# Patient Record
Sex: FEMALE | NOT HISPANIC OR LATINO | Employment: UNEMPLOYED | URBAN - METROPOLITAN AREA
[De-identification: names, ages, dates, MRNs, and addresses within clinical notes are randomized per-mention and may not be internally consistent; named-entity substitution may affect disease eponyms.]

---

## 2021-04-06 ENCOUNTER — OFFICE VISIT (OUTPATIENT)
Dept: FAMILY MEDICINE CLINIC | Facility: CLINIC | Age: 36
End: 2021-04-06

## 2021-04-06 VITALS
TEMPERATURE: 97.8 F | HEIGHT: 61 IN | OXYGEN SATURATION: 98 % | WEIGHT: 134 LBS | BODY MASS INDEX: 25.3 KG/M2 | DIASTOLIC BLOOD PRESSURE: 63 MMHG | SYSTOLIC BLOOD PRESSURE: 106 MMHG | HEART RATE: 72 BPM

## 2021-04-06 DIAGNOSIS — E55.9 VITAMIN D DEFICIENCY: ICD-10-CM

## 2021-04-06 DIAGNOSIS — R10.12 LEFT UPPER QUADRANT ABDOMINAL PAIN: ICD-10-CM

## 2021-04-06 DIAGNOSIS — R10.13 EPIGASTRIC PAIN: ICD-10-CM

## 2021-04-06 DIAGNOSIS — Z00.00 HEALTH MAINTENANCE EXAMINATION: Primary | ICD-10-CM

## 2021-04-06 DIAGNOSIS — E03.9 ACQUIRED HYPOTHYROIDISM: ICD-10-CM

## 2021-04-06 PROCEDURE — 99385 PREV VISIT NEW AGE 18-39: CPT | Performed by: NURSE PRACTITIONER

## 2021-04-06 PROCEDURE — 93000 ELECTROCARDIOGRAM COMPLETE: CPT | Performed by: NURSE PRACTITIONER

## 2021-04-06 RX ORDER — SUCRALFATE 1 G/1
1 TABLET ORAL 4 TIMES DAILY
Qty: 60 TABLET | Refills: 0 | Status: SHIPPED | OUTPATIENT
Start: 2021-04-06 | End: 2021-09-13

## 2021-04-06 RX ORDER — FAMOTIDINE 40 MG/1
40 TABLET, FILM COATED ORAL 2 TIMES DAILY
Qty: 60 TABLET | Refills: 3 | Status: SHIPPED | OUTPATIENT
Start: 2021-04-06 | End: 2021-09-13

## 2021-04-06 RX ORDER — LEVOTHYROXINE SODIUM 0.12 MG/1
125 TABLET ORAL DAILY
COMMUNITY
End: 2021-05-04

## 2021-04-06 NOTE — PROGRESS NOTES
"Chief Complaint  Establish Care and Annual Exam    Subjective          Janay Weiss presents to [facility]  Very pleasant patient here today needs a new PCP she moved here from [place] several months ago.  She has no acute complaints, she has a history of acquired hypothyroid since  she is up-to-date with level thyroxine.  She will need some fasting labs soon.  She said no chest pain no shortness of breath but she has been having some epigastric pain a couple months, without radiation and its after meals last a couple hours, and then resolve generally about 2 to 3 days a week.  She has no nausea vomiting no coffee-ground emesis no black tarry stools no associated loose stools or constipation.  She presently has no pain, she has no risk factors or significant risk factors for pancreatitis.  Still has her gallbladder the only abdominal surgery she is has a  approximately 3 years ago with the birth of her healthy child little 3-year-old girl.  She is  and actually also her pleasant  several hours ago.    She has some anxiety now as she has been trying to conceive with her  about 6 or 7 months, and is not pregnant,  She has an appointment with an OB/GYN on the eighth,    Previous pregnancy unfortunately has she had 4 miscarriages prior to conception of full-term pregnancy.    Past medical history as above, healthy  Social history as above no tobacco drug or alcohol abuse.  Family history no breast or colon cancer in the family  No diabetes no heart disease stroke           Objective   Vital Signs:   /63   Pulse 72   Temp 97.8 °F (36.6 °C) (Temporal)   Ht 154.9 cm (61\")   Wt 60.8 kg (134 lb)   SpO2 98%   BMI 25.32 kg/m²     Physical Exam  Vitals reviewed.   Constitutional:       Appearance: She is well-developed.   HENT:      Head: Normocephalic.      Nose: Nose normal.   Eyes:      General: No scleral icterus.     Conjunctiva/sclera: " Conjunctivae normal.      Pupils: Pupils are equal, round, and reactive to light.   Neck:      Thyroid: No thyromegaly.      Vascular: No JVD.   Cardiovascular:      Rate and Rhythm: Normal rate and regular rhythm.      Heart sounds: Normal heart sounds. No murmur heard.   No friction rub. No gallop.    Pulmonary:      Effort: Pulmonary effort is normal. No respiratory distress.      Breath sounds: Normal breath sounds. No stridor. No wheezing or rales.   Abdominal:      General: Bowel sounds are normal. There is no distension.      Palpations: Abdomen is soft.      Tenderness: There is no abdominal tenderness.      Comments: No hepatosplenomegaly, no ascites,   Musculoskeletal:         General: No tenderness.      Cervical back: Neck supple.   Lymphadenopathy:      Cervical: No cervical adenopathy.   Skin:     General: Skin is warm and dry.      Findings: No erythema or rash.   Neurological:      Mental Status: She is alert and oriented to person, place, and time.      Deep Tendon Reflexes: Reflexes are normal and symmetric.   Psychiatric:         Behavior: Behavior normal.         Thought Content: Thought content normal.         Judgment: Judgment normal.        Result Review :                 Assessment and Plan    Diagnoses and all orders for this visit:    1. Health maintenance examination (Primary)  -     CBC & Differential; Future  -     Comprehensive Metabolic Panel; Future  -     Lipid Panel With LDL / HDL Ratio; Future  -     TSH; Future  -     Urinalysis With Microscopic If Indicated (No Culture) - Urine, Clean Catch; Future  -     Vitamin D 25 Hydroxy; Future  -     ECG 12 Lead  -     H. Pylori Antigen, Stool - Stool, Per Rectum; Future  -     Amylase; Future  -     Lipase; Future    2. Vitamin D deficiency  -     CBC & Differential; Future  -     Comprehensive Metabolic Panel; Future  -     Lipid Panel With LDL / HDL Ratio; Future  -     TSH; Future  -     Urinalysis With Microscopic If Indicated (No  Culture) - Urine, Clean Catch; Future  -     Vitamin D 25 Hydroxy; Future  -     H. Pylori Antigen, Stool - Stool, Per Rectum; Future  -     Amylase; Future  -     Lipase; Future    3. Acquired hypothyroidism  -     CBC & Differential; Future  -     Comprehensive Metabolic Panel; Future  -     Lipid Panel With LDL / HDL Ratio; Future  -     TSH; Future  -     Urinalysis With Microscopic If Indicated (No Culture) - Urine, Clean Catch; Future  -     Vitamin D 25 Hydroxy; Future    4. Left upper quadrant abdominal pain  -     H. Pylori Antigen, Stool - Stool, Per Rectum; Future  -     Amylase; Future  -     Lipase; Future    5. Epigastric pain  -     CBC & Differential; Future  -     Comprehensive Metabolic Panel; Future  -     Lipid Panel With LDL / HDL Ratio; Future  -     TSH; Future  -     Urinalysis With Microscopic If Indicated (No Culture) - Urine, Clean Catch; Future  -     Vitamin D 25 Hydroxy; Future  -     ECG 12 Lead  -     H. Pylori Antigen, Stool - Stool, Per Rectum; Future  -     Amylase; Future  -     Lipase; Future    Other orders  -     sucralfate (Carafate) 1 g tablet; Take 1 tablet by mouth 4 (Four) Times a Day. Before meals and at bedtime  Dispense: 60 tablet; Refill: 0  -     famotidine (Pepcid) 40 MG tablet; Take 1 tablet by mouth 2 (Two) Times a Day. For acid reflux  Dispense: 60 tablet; Refill: 3        Follow Up   No follow-ups on file.  Patient was given instructions and counseling regarding her condition or for health maintenance advice. Please see specific information pulled into the AVS if appropriate.     Healthy diet regular exercise OTC take vitamin D 1000 international units daily of vitamin D3    Any severe epigastric pain  Weakness coffee-ground emesis black tarry stools fever emergency room  See gastroenterology if your symptoms do not improve in a few weeks as discussed

## 2021-04-06 NOTE — PATIENT INSTRUCTIONS
You likely have inflammation or erosion of your esophagus or gastric lining,  Pepcid 40 mg twice a day   Decrease spicy greasy foods smaller more bland meals more frequently the next several months  Carafate generic before each meal and bedtime x2 weeks then DC  Discontinue if any issues with this medication  If Pepcid is not working for you or famotidine, I will give you something a little stronger but this should help substantially    Mylanta or similar as needed  For epigastric discomfort    Avoid ibuprofen for this type of pain Tylenol okay  But generally you need an antiacid and something to soothe your stomach for epigastric pain    Severe pain fever chills nausea vomiting weakness coffee-ground emesis black tarry stools weakness emergency room    If your symptoms not improve over the next month you will need an EGD and to see gastroenterology  Follow-up in the office in 6 weeks to recheck your epigastrium and reflux,      Left mole on your left upper chest,, no worries here but should you have changes increase in size color or other irregularity you should see dermatology and call me immediately for referral    Likely will need to put you on vitamin D but will await your lab    Should you become pregnant when you become pregnant will need to increase your thyroid medicine approximately 30% or more  So schedule appointment as soon as possible or call    Use the computer encourage you to sign up for my chart for good communications    We will need to start taking a prenatal vitamin as well for the good folic acid  Your OB will likely get this to you in a couple days otherwise call me and I will do it    More vegetables  More healthy chicken  Decrease breads pastas and rice and sauces which increase appetite, and promote fatty liver and other abnormalities      Yearly physical and labs prior    Come in tomorrow for your fasting labs, and your results may be back in time for your appointment  With your  OB/GYN.  Call for your results a couple days later        Food Choices for Gastroesophageal Reflux Disease, Adult  When you have gastroesophageal reflux disease (GERD), the foods you eat and your eating habits are very important. Choosing the right foods can help ease your discomfort. Think about working with a food expert (dietitian) to help you make good choices.  What are tips for following this plan?  Reading food labels  · Read the label for foods that are low in saturated fat. Foods that may help with your symptoms include:  ? Foods that have less than 5% of daily value (DV) of fat.  ? Foods that have 0 grams of trans fat.  Cooking  · Do not herrera your food. Cook your food by baking, steaming, grilling, or broiling. These are all methods that do not need a lot of fat for cooking.  · To add flavor, try to use herbs that are low in spice and acidity.  Meal planning    · Choose healthy foods that are low in fat, such as:  ? Fruits and vegetables.  ? Whole grains.  ? Low-fat dairy products.  ? Lean meats, fish, and poultry.  · Eat small meals often instead of eating 3 large meals each day. Eat your meals slowly in a place where you are relaxed. Avoid bending over or lying down until 2-3 hours after eating.  · Limit high-fat foods such as fatty meats or fried foods.  · Limit your intake of oils, butter, and shortening to less than 8 teaspoons each day.  · Avoid the following:  ? Foods that cause symptoms. These may be different for different people. Keep a food diary to keep track of foods that cause symptoms.  ? Alcohol.  ? Drinking a lot of liquid with meals.  ? Eating meals during the 2-3 hours before bed.  Lifestyle  · Stay at a healthy weight. Ask your doctor what weight is healthy for you. If you need to lose weight, work with your doctor to do so safely.  · Exercise for at least 30 minutes on 5 or more days each week, or as told by your doctor.  · Wear loose-fitting clothes.  · Do not use any products that  contain nicotine or tobacco, such as cigarettes, e-cigarettes, and chewing tobacco. If you need help quitting, ask your doctor.  · Sleep with the head of your bed higher than your feet. Use a wedge under the mattress or blocks under the bed frame to raise the head of the bed.  What foods should eat?    Eat a healthy, well-balanced diet of fruits, vegetables, whole grains, low-fat dairy products, lean meats, fish, and poultry. Each person is different. Foods that may cause symptoms in one person may not cause any symptoms in another person. Work with your doctor to find foods that are safe for you.  The items listed above may not be a complete list of foods and beverages you can eat. Contact a dietitian for more information.  What foods should I avoid?  Limiting some of these foods may help in managing the symptoms of GERD. Everyone is different. Talk with a food expert or your doctor to help you find the exact foods to avoid, if any.  Fruits  Any fruits prepared with added fat. Any fruits that cause symptoms. For some people, this may include citrus fruits, such as oranges, grapefruit, pineapple, and quoc.  Vegetables  Deep-fried vegetables. French fries. Any vegetables prepared with added fat. Any vegetables that cause symptoms. For some people, this may include tomatoes and tomato products, chili peppers, onions and garlic, and horseradish.  Grains  Pastries or quick breads with added fat.  Meats and other proteins  High-fat meats, such as fatty beef or pork, hot dogs, ribs, ham, sausage, salami, and sher. Fried meat or protein, including fried fish and fried chicken. Nuts and nut butters.  Dairy  Whole milk and chocolate milk. Sour cream. Cream. Ice cream. Cream cheese. Milkshakes.  Fats and oils  Butter. Margarine. Shortening. Ghee.  Beverages  Coffee and tea, with or without caffeine. Carbonated beverages. Sodas. Energy drinks. Fruit juice made with acidic fruits (such as orange or grapefruit). Tomato juice.  Alcoholic drinks.  Sweets and desserts  Chocolate and cocoa. Donuts.  Seasonings and condiments  Pepper. Peppermint and spearmint. Added salt. Any condiments, herbs, or seasonings that cause symptoms. For some people, this may include nelson, hot sauce, or vinegar-based salad dressings.  The items listed above may not be a complete list of foods and beverages you should avoid. Contact a dietitian for more information.  Questions to ask your doctor  Diet and lifestyle changes are often the first steps that are taken to manage symptoms of GERD. If diet and lifestyle changes do not help, talk with your doctor about taking medicines.  Where to find more information  · International Foundation for Gastrointestinal Disorders: aboutgerd.org  Summary  · When you have GERD, food and lifestyle choices are very important in easing your symptoms.  · Eat small meals often instead of 3 large meals a day. Eat your meals slowly and in a place where you are relaxed.  · Avoid bending over or lying down until 2-3 hours after eating.  · Limit high-fat foods such as fatty meat or fried foods.  This information is not intended to replace advice given to you by your health care provider. Make sure you discuss any questions you have with your health care provider.  Document Revised: 10/12/2020 Document Reviewed: 10/12/2020  Elsevier Patient Education © 2021 [employer]

## 2021-04-28 ENCOUNTER — TELEPHONE (OUTPATIENT)
Dept: FAMILY MEDICINE CLINIC | Facility: CLINIC | Age: 36
End: 2021-04-28

## 2021-04-28 DIAGNOSIS — E03.9 ACQUIRED HYPOTHYROIDISM: Primary | ICD-10-CM

## 2021-05-03 ENCOUNTER — INITIAL PRENATAL (OUTPATIENT)
Dept: OBSTETRICS AND GYNECOLOGY | Facility: CLINIC | Age: 36
End: 2021-05-03

## 2021-05-03 VITALS — DIASTOLIC BLOOD PRESSURE: 67 MMHG | BODY MASS INDEX: 24 KG/M2 | WEIGHT: 127 LBS | SYSTOLIC BLOOD PRESSURE: 107 MMHG

## 2021-05-03 DIAGNOSIS — Z98.891 HISTORY OF CESAREAN SECTION: ICD-10-CM

## 2021-05-03 DIAGNOSIS — Z34.80 SUPERVISION OF OTHER NORMAL PREGNANCY, ANTEPARTUM: Primary | ICD-10-CM

## 2021-05-03 DIAGNOSIS — E03.9 HYPOTHYROIDISM (ACQUIRED): ICD-10-CM

## 2021-05-03 LAB
B-HCG UR QL: POSITIVE
GLUCOSE UR STRIP-MCNC: NEGATIVE MG/DL
HCG INTACT+B SERPL-ACNC: NORMAL MIU/ML
INTERNAL NEGATIVE CONTROL: NEGATIVE
INTERNAL POSITIVE CONTROL: POSITIVE
Lab: ABNORMAL
PROT UR STRIP-MCNC: NEGATIVE MG/DL
T4 FREE SERPL-MCNC: 1.63 NG/DL (ref 0.93–1.7)
TSH SERPL DL<=0.005 MIU/L-ACNC: 5.44 UIU/ML (ref 0.27–4.2)

## 2021-05-03 PROCEDURE — 81025 URINE PREGNANCY TEST: CPT | Performed by: OBSTETRICS & GYNECOLOGY

## 2021-05-03 PROCEDURE — 0501F PRENATAL FLOW SHEET: CPT | Performed by: OBSTETRICS & GYNECOLOGY

## 2021-05-03 RX ORDER — DOXYLAMINE SUCCINATE AND PYRIDOXINE HYDROCHLORIDE, DELAYED RELEASE TABLETS 10 MG/10 MG 10; 10 MG/1; MG/1
TABLET, DELAYED RELEASE ORAL
Qty: 100 TABLET | Refills: 3 | Status: SHIPPED | OUTPATIENT
Start: 2021-05-03 | End: 2021-09-13

## 2021-05-03 RX ORDER — PRENATAL VIT NO.126/IRON/FOLIC 28MG-0.8MG
TABLET ORAL DAILY
COMMUNITY
End: 2021-09-13

## 2021-05-03 RX ORDER — PROMETHAZINE HYDROCHLORIDE 12.5 MG/1
12.5 TABLET ORAL EVERY 6 HOURS PRN
Qty: 30 TABLET | Refills: 0 | Status: SHIPPED | OUTPATIENT
Start: 2021-05-03 | End: 2021-05-16 | Stop reason: SDUPTHER

## 2021-05-03 RX ORDER — FOLIC ACID 1 MG/1
1 TABLET ORAL DAILY
COMMUNITY
End: 2021-09-13

## 2021-05-03 NOTE — PROGRESS NOTES
"Initial OB Visit    Chief Complaint   Patient presents with   • Initial Prenatal Visit     vomiting unable to hold food or liquid      Int ID [id number]  Humberto Weiss is being seen today for her first obstetrical visit.  She is a 35 y.o.    6w2d gestation by sure LMP  FOB: Cuco Duggan, he is the father of her daughter  This is a planned pregnancy.   OB History    Para Term  AB Living   6 1 1   4 1   SAB TAB Ectopic Molar Multiple Live Births   4         1      # Outcome Date GA Lbr Edward/2nd Weight Sex Delivery Anes PTL Lv   6 Current            5 Term 17 37w0d  2268 g (5 lb)  CS-Unspec   CLARENCE   4 2017           3 2016           2 2015           1 2014               Current obstetric complaints: Nausea, and lots of vomiting- had in last pregnancy, too.  She did not take any medications last pregnancy; weight loss ~7lbs.    - levothyroxine 125mcg- not est'd with endocrinology in [place]  Prior obstetric issues, potential pregnancy concerns: Delivered by CS in [place] due to \"baby's head not able to turn\" (second stage arrest)  Family history of genetic issues (includes FOB): denies; reports a h/o 4 SABs \"very early\" - like \"2 weeks\"- never required D&C or medication.  She reports that she saw M in [place] and was told everything was fine.    Prior infections concerning in pregnancy (Rash, fever since LMP): denies  Varicella Hx:reports vaccination  Prior genetic testing: denies  History of abnormal pap smears: denies  History of STIs: denies  History of HSV in self or partner? denies    Past Medical History:   Diagnosis Date   • Hypothyroidism        Past Surgical History:   Procedure Laterality Date   •  SECTION           Current Outpatient Medications:   •  famotidine (Pepcid) 40 MG tablet, Take 1 tablet by mouth 2 (Two) Times a Day. For acid reflux, Disp: 60 tablet, Rfl: 3  •  folic acid (FOLVITE) 1 MG tablet, Take 1 mg by mouth Daily., Disp: , Rfl:   •  levothyroxine " (SYNTHROID, LEVOTHROID) 125 MCG tablet, Take 125 mcg by mouth Daily., Disp: , Rfl:   •  prenatal vitamin (prenatal, CLASSIC, vitamin) tablet, Take  by mouth Daily., Disp: , Rfl:   •  sucralfate (Carafate) 1 g tablet, Take 1 tablet by mouth 4 (Four) Times a Day. Before meals and at bedtime, Disp: 60 tablet, Rfl: 0  •  doxylamine-pyridoxine (Diclegis) 10-10 MG tablet delayed-release EC tablet, 2 PO QHS, if not improved in 2 days, 1 POQAM and 2PO QHS.  If no improvement in 2 days, then 1 PO QAM, 1 PO in the afternoon, and 2 PO QHS, Disp: 100 tablet, Rfl: 3  •  promethazine (PHENERGAN) 12.5 MG tablet, Take 1 tablet by mouth Every 6 (Six) Hours As Needed for Nausea or Vomiting., Disp: 30 tablet, Rfl: 0    No Known Allergies    Social History     Socioeconomic History   • Marital status:      Spouse name: Not on file   • Number of children: Not on file   • Years of education: Not on file   • Highest education level: Not on file   Tobacco Use   • Smoking status: Never Smoker   • Smokeless tobacco: Never Used   Substance and Sexual Activity   • Alcohol use: Not Currently   • Drug use: Never   • Sexual activity: Yes     Partners: Male     Birth control/protection: None       Family History   Problem Relation Age of Onset   • Breast cancer Neg Hx    • Ovarian cancer Neg Hx    • Uterine cancer Neg Hx    • Colon cancer Neg Hx    • Deep vein thrombosis Neg Hx    • Pulmonary embolism Neg Hx        Review of systems    Review of Systems - Negative except per HPI for 10 point review of systems including General, Psychological, Ophthalmic, ENT, Endocrine, Respiratory, Cardiovascular, Gastrointestinal, Genito-Urinary, Musculoskeletal, Neurological, Dermatological        Objective    /67   Wt 57.6 kg (127 lb)   LMP 03/20/2021   BMI 24.00 kg/m²       General Appearance:    Alert, cooperative, in no acute distress, habitus normal   Head:    Normocephalic, without obvious abnormality, atraumatic   Eyes:            Lids  and lashes normal, conjunctivae and sclerae normal, no   icterus, no pallor, corneas clear   Ears:    Ears appear intact with no abnormalities noted       Neck:   No adenopathy, supple, trachea midline, no thyromegaly   Back:     No kyphosis present, no scoliosis present,                       Lungs:     Clear to auscultation,respirations regular, even and                   unlabored    Heart:    Regular rhythm and normal rate, normal S1 and S2, no            murmur, no gallop, no rub, no click   Breast Exam:    No masses, No nipple discharge   Abdomen:     Normal bowel sounds, no masses, no organomegaly, soft        non-tender, non-distended, no guarding, no rebound                 tenderness   Genitalia:    Vulva - BUS-WNL, NEFG    Vagina - No discharge, No bleeding    Cervix - No Lesions, closed     Uterus - Consistent with 6 weeks    Adnexa - No masses, NT    Pelvimetry - clinically adequate, gynecoid pelvis     Extremities:   Moves all extremities well, no edema, no cyanosis, no              redness   Pulses:   Pulses palpable and equal bilaterally   Skin:   No bleeding, bruising or rash   Lymph nodes:   No palpable adenopathy   Neurologic:   Sensation intact, A&O times 3      Assessment  Pregnancy at 6w2d  Hypothyroidism  H/o recurrent pregnancy loss  Nausea, vomiting in pregnancy   Plan    Initial labs drawn, GC/CHL screen done  Patient is on Prenatal vitamins  Problem list reviewed and updated.  Reviewed routine prenatal care with the office to include but not limited to:   Questions regarding activity and food restrictions, avoidance of alcohol, tobacco and drugs and saunas/hot tubs.  Reviewed nature of practice and hospital.  Reviewed recommended follow up, importance of compliance with care. We reviewed testing in pregnancy including HIV testing and urine drug screen.    Reviewed aneuploidy screening and CF/SMA screening.  We reviewed limitations of testing, possibility of false positive/negative results,  possible need for other tests as indicated.    Counseled on limitations of ultrasound in pregnancy in detecting aneuploidy/fetal anomalies    Reviewed nausea, vomiting. Reviewed Body mass index is 24 kg/m²..  In pregnancy, her recommended weight gain is 35lbs. If she were to lose more weight or be unable to maintain hydration, recommend immediate follow up     H/o recurrent pregnancy loss:   Reports normal work up last pregnancy. Will request records. Will get hcg today and US within one week.     Hypothyroidism:   Check TSH, free T4 and adjust accordingly.     All questions answered.   Return in about 1 week (around 5/10/2021) for dating US, 4 weeks OB visit.      Susana Ramirez MD

## 2021-05-03 NOTE — PATIENT INSTRUCTIONS
"Nausea/vomiting in pregnancy:  To help with nausea and vomiting you may try the following over the counter products:  Julia chews, julia tea, julia gum  Mint tea, peppermint gum or candy  B6/Doxylamine: to be taken daily, not just when symptoms occur  Pyridoxine (also known as B6): 10 to 25 mg orally every six to eight hours; the maximum treatment dose suggested for pregnant women is 200 mg/day.  Doxylamine (available in some over-the-counter sleeping pills (eg, Unisom Sleep Tabs) and as an antihistamine chewable tablet (Aldex AN)). One-half of the 25 mg over-the-counter tablet or two chewable 5 mg tablets can be used off-label as an antiemetic  · The Association of Profess[employer] of Gyne[employer] and Obstetric[employer] has released a smart phone application that can help you monitor and manage your symptoms.  The Application is \"Managing NVP,\" and has a green icon that says \"APGO WELLMOM.\"    If these do not work, we may need to try prescription medications.    Please contact us if you are unable to tolerate liquids (even sips of water) for over six to eight hours, have weight loss of over 10% of your body weight, blood in vomit, fever (temp of 100.4 or higher), or other concerning symptoms arise.          The following are CPT codes for the optional tests in pregnancy:  Cystic fibrosis screenin  Spinal Muscular atrophy screening 86416  Cell free DNA (Trisomy 21, 18, 13 and sex chromosomes) 42819    The ICD 10 code for most pregnancies is Z34.90  Travel During Pregnancy:  • Always use seatbelts.  A lap belt should be worn below the abdomen (across the hips) and the shoulder belt should be worn across the center of your chest (between the breasts) away from your neck.  Do not put the shoulder belt under your arm or behind your back.  Pull any slack out of the belt.  • Air travel is safe in most uncomplicated pregnancies, but we do not recommend air travel past 36 weeks.  Airlines may also have restrictions, so check " with your airline before flying.  For some international flights, the travel cut off may be as early as 28 weeks gestation, and some airlines may require letters from your physician.  • When going on long trips in car, plane, train, or bus, frequent ambulation is important to prevent blood clots in legs and/or lungs.  The following may help with prevention of blood clots in legs: Drink lots of fluid, wear loose-fitting clothing, walk and stretch at regular intervals.    • Avoid areas with Zika outbreaks.  For the latest information, you may visit: www.cdc.gov/travel/notices/.  Resources: , , Committee Opinion 455  Nutrition during pregnancy  • Average weight gain during pregnancy is based on your pre-pregnancy body mass index (BMI).  See below for recommended weight gain:  o Underweight (BMI <18.5), we recommend 28 to 40lb weight gain  o Normal weight (BMI 18.5 to 24.9), we recommend a 25 to 35lb weight gain  o Overweight (BMI 25-29.9), we recommend a 15 to 25lb weight gain  o Obese (BMI >30), we recommend keeping weight gain under 20 lbs.    • You should speak with your physician regarding your specific weight goals for this pregnancy.   • Foods to avoid include:   o Fish: avoid certain types of fish such as shark, swordfish, anna marie mackerel, tilefish.  Limit white (albacore) tuna to 6 oz per week.  Choose fish and shellfish such as shrimp, salmon, catfish, [place].  o Food-borne illness: Pregnant women are much more likely to get Listeriosis than non-pregnant women.  To help prevent this, avoid eating unpasteurized milk and unpasteurized milk products, hot dogs/lunch meat/cold cuts unless they are heated until steaming right before serving, refrigerated meat spreads, refrigerated smoked seafood, raw or undercooked seafood/eggs/meat.   • Vitamin D helps development of the baby’s bones and teeth.  Good sources of Vitamin D include milk fortified with Vitamin D and fatty fish such as salmon.    • Folic  acid, also known as folate, helps develop the baby’s brain and spine.  You should make sure your vitamin contains extra folic acid - at least 400mcg.    • Iron helps make red blood cells.  You need to make extra red blood cell sin pregnancy.  We recommend eating Iron-rich foods such as lean red meat, poultry, fish, dried beans and peas, iron-fortified cereals, and prune juice.  You may be recommended an iron supplement.  If so, it is absorbed more easily if taken with vitamin C-rich foods such as citrus fruits or tomatoes.    • It is important to eat a well balanced diet.  A good recourse for nutrition recommendations is: www.choosemyplate.gov.  • Limit caffeine intake to 200mg daily.  Some coffees/teas/sodas have very different levels of caffeine per serving, so check the nutrition labeling.   Resources: , Committee Opinion 548  Exercise during pregnancy  • If you are healthy and your pregnancy is normal, it is safe to continue or start most types of exercise.   Physical activity does not increase your risk of miscarriage, low birth weight or early delivery, but you should discuss specific limitations or any complications with your physician.    • Benefits of exercise during pregnancy include: reduced back pain, less constipation, promotes overall health and healthy weight gain which may decrease risks for certain pregnancy complications such as diabetes and/or preeclampsia.  • The CDC recommends 150 minutes of moderate intensity aerobic exercise per week.  Moderate intensity means that you are moving enough to raise your heart rate and start sweating, but you can talk normally.  Brisk walking, swimming/water workouts, modified yoga/pilates, and use of elliptical machines and/or stationary bikes are examples of aerobic activity.    • Precautions:  o Stay hydrated.  Drink plenty of water before, during and after your workout  o Wear supportive clothing such as a sports bra  o Do not become overheated.  Do not  exercise outside when it is very hot or humid  o Avoid lying flat on your back as much as possible  o AVOID contact sports, skydiving, sports that risk falling (such as skiing, surfing, off-road cycling, gymnastics, horseback riding), hot yoga/hot pilates, scuba diving  • Stop exercising if you experience: bleeding from the vagina, feeling dizzy/faint, shortness of breath before starting exercise, chest pain, headache, muscle weakness, calf pain or swelling, regular uterine contractions, fluid leaking from the vagina.    • Postpartum exercise: Continuing exercise after you deliver your baby will help boost your energy, strengthen muscles, promote better sleep, and relieve stress.  It also may be useful in preventing postpartum depression.  150 minutes of moderate-intensity aerobic activity is recommended.  Types of exercise and when you can start a regular exercise routine may be limited by the type of delivery you had.  Please discuss with your physician prior to resuming or starting exercise.    Resources: ,   Back pain during pregnancy  • Back pain is very common during pregnancy.  It may arise due to strain on your back muscles, weakness of the abdominal muscles, and pregnancy hormones.    • To prevent back pain:  o Wear shoes with good support. Flat shoes and high heels may not have good arch support.  o Consider a firm mattress  o Use good lifting practices.  Do not bend from the waist to pick things up.  Squat down and bend your knees, keeping a straight back.  o Sit in chairs with good back support or use a small pillow behind your lower back.    o Sleep on your side with one or two pillows between your legs  • To ease back pain:   o Exercise can help stretch strained muscles and strengthen weak muscles to promote good posture  • Contact your health care provider with severe pain, pain that persists for more than 2 weeks, fever, burning during urination, or vaginal bleeding.  Resources: FAQ  115

## 2021-05-04 ENCOUNTER — TELEPHONE (OUTPATIENT)
Dept: OBSTETRICS AND GYNECOLOGY | Facility: CLINIC | Age: 36
End: 2021-05-04

## 2021-05-04 LAB
ABO GROUP BLD: ABNORMAL
AMPHETAMINES UR QL SCN: NEGATIVE NG/ML
BARBITURATES UR QL SCN: NEGATIVE NG/ML
BASOPHILS # BLD AUTO: 0 X10E3/UL (ref 0–0.2)
BASOPHILS NFR BLD AUTO: 0 %
BENZODIAZ UR QL: NEGATIVE NG/ML
BLD GP AB SCN SERPL QL: NEGATIVE
BZE UR QL: NEGATIVE NG/ML
CANNABINOIDS UR QL SCN: NEGATIVE NG/ML
EOSINOPHIL # BLD AUTO: 0.2 X10E3/UL (ref 0–0.4)
EOSINOPHIL NFR BLD AUTO: 1 %
ERYTHROCYTE [DISTWIDTH] IN BLOOD BY AUTOMATED COUNT: 12.6 % (ref 11.7–15.4)
HBV SURFACE AG SERPL QL IA: NEGATIVE
HCT VFR BLD AUTO: 37.5 % (ref 34–46.6)
HCV AB S/CO SERPL IA: <0.1 S/CO RATIO (ref 0–0.9)
HGB BLD-MCNC: 12.3 G/DL (ref 11.1–15.9)
HIV 1+2 AB+HIV1 P24 AG SERPL QL IA: NON REACTIVE
IMM GRANULOCYTES # BLD AUTO: 0.1 X10E3/UL (ref 0–0.1)
IMM GRANULOCYTES NFR BLD AUTO: 1 %
LYMPHOCYTES # BLD AUTO: 3.1 X10E3/UL (ref 0.7–3.1)
LYMPHOCYTES NFR BLD AUTO: 26 %
MCH RBC QN AUTO: 28.7 PG (ref 26.6–33)
MCHC RBC AUTO-ENTMCNC: 32.8 G/DL (ref 31.5–35.7)
MCV RBC AUTO: 87 FL (ref 79–97)
METHADONE UR QL SCN: NEGATIVE NG/ML
MONOCYTES # BLD AUTO: 0.8 X10E3/UL (ref 0.1–0.9)
MONOCYTES NFR BLD AUTO: 7 %
NEUTROPHILS # BLD AUTO: 8 X10E3/UL (ref 1.4–7)
NEUTROPHILS NFR BLD AUTO: 65 %
OPIATES UR QL: NEGATIVE NG/ML
PCP UR QL: NEGATIVE NG/ML
PLATELET # BLD AUTO: 252 X10E3/UL (ref 150–450)
PROPOXYPH UR QL SCN: NEGATIVE NG/ML
RBC # BLD AUTO: 4.29 X10E6/UL (ref 3.77–5.28)
RH BLD: POSITIVE
RPR SER QL: NON REACTIVE
RUBV IGG SERPL IA-ACNC: 4.35 INDEX
WBC # BLD AUTO: 12.2 X10E3/UL (ref 3.4–10.8)

## 2021-05-04 RX ORDER — LEVOTHYROXINE SODIUM 0.15 MG/1
150 TABLET ORAL DAILY
Qty: 30 TABLET | Refills: 1 | Status: SHIPPED | OUTPATIENT
Start: 2021-05-04 | End: 2021-05-30 | Stop reason: SDUPTHER

## 2021-05-05 LAB
BACTERIA UR CULT: NORMAL
BACTERIA UR CULT: NORMAL
C TRACH RRNA SPEC QL NAA+PROBE: NEGATIVE
CYTOLOGIST CVX/VAG CYTO: NORMAL
CYTOLOGY CVX/VAG DOC CYTO: NORMAL
CYTOLOGY CVX/VAG DOC THIN PREP: NORMAL
DX ICD CODE: NORMAL
HIV 1 & 2 AB SER-IMP: NORMAL
HPV I/H RISK 4 DNA CVX QL PROBE+SIG AMP: NEGATIVE
N GONORRHOEA RRNA SPEC QL NAA+PROBE: NEGATIVE
OTHER STN SPEC: NORMAL
STAT OF ADQ CVX/VAG CYTO-IMP: NORMAL
T VAGINALIS DNA SPEC QL NAA+PROBE: NEGATIVE

## 2021-05-05 NOTE — TELEPHONE ENCOUNTER
Gave spouse lab results- ok except TSH a little elevated and that new prescription had been sent to pharmacy.  [id number] utilized. Spouse gave verbal understanding.

## 2021-05-06 ENCOUNTER — TELEPHONE (OUTPATIENT)
Dept: OBSTETRICS AND GYNECOLOGY | Facility: CLINIC | Age: 36
End: 2021-05-06

## 2021-05-06 RX ORDER — DIPHENHYDRAMINE HYDROCHLORIDE 25 MG/1
25 CAPSULE ORAL EVERY 8 HOURS
Qty: 90 TABLET | Refills: 2 | Status: SHIPPED | OUTPATIENT
Start: 2021-05-06 | End: 2021-06-22 | Stop reason: SDUPTHER

## 2021-05-17 RX ORDER — PROMETHAZINE HYDROCHLORIDE 12.5 MG/1
12.5 TABLET ORAL EVERY 6 HOURS PRN
Qty: 30 TABLET | Refills: 0 | Status: SHIPPED | OUTPATIENT
Start: 2021-05-17 | End: 2021-05-30 | Stop reason: SDUPTHER

## 2021-06-01 RX ORDER — LEVOTHYROXINE SODIUM 0.15 MG/1
150 TABLET ORAL DAILY
Qty: 30 TABLET | Refills: 1 | Status: SHIPPED | OUTPATIENT
Start: 2021-06-01 | End: 2021-06-29

## 2021-06-01 RX ORDER — PROMETHAZINE HYDROCHLORIDE 12.5 MG/1
12.5 TABLET ORAL EVERY 6 HOURS PRN
Qty: 30 TABLET | Refills: 0 | Status: SHIPPED | OUTPATIENT
Start: 2021-06-01 | End: 2021-06-22 | Stop reason: SDUPTHER

## 2021-06-02 ENCOUNTER — ROUTINE PRENATAL (OUTPATIENT)
Dept: OBSTETRICS AND GYNECOLOGY | Facility: CLINIC | Age: 36
End: 2021-06-02

## 2021-06-02 VITALS — DIASTOLIC BLOOD PRESSURE: 69 MMHG | BODY MASS INDEX: 24.37 KG/M2 | WEIGHT: 129 LBS | SYSTOLIC BLOOD PRESSURE: 101 MMHG

## 2021-06-02 DIAGNOSIS — E03.9 HYPOTHYROIDISM (ACQUIRED): Primary | ICD-10-CM

## 2021-06-02 DIAGNOSIS — O21.9 NAUSEA AND VOMITING IN PREGNANCY: ICD-10-CM

## 2021-06-02 DIAGNOSIS — Z34.80 SUPERVISION OF OTHER NORMAL PREGNANCY, ANTEPARTUM: ICD-10-CM

## 2021-06-02 LAB
GLUCOSE UR STRIP-MCNC: NEGATIVE MG/DL
GLUCOSE UR STRIP-MCNC: NEGATIVE MG/DL
PROT UR STRIP-MCNC: NEGATIVE MG/DL
PROT UR STRIP-MCNC: NEGATIVE MG/DL

## 2021-06-02 PROCEDURE — 0502F SUBSEQUENT PRENATAL CARE: CPT | Performed by: OBSTETRICS & GYNECOLOGY

## 2021-06-02 NOTE — PATIENT INSTRUCTIONS
The following are CPT codes for the optional tests in pregnancy:  Cystic fibrosis screenin  Spinal Muscular atrophy screening 76011  [id number] - Cell free DNA (Trisomy 21, 18, 13 and sex chromosomes) 46223    The ICD 10 code for most pregnancies is Z34.90

## 2021-06-02 NOTE — PROGRESS NOTES
Chief Complaint   Patient presents with   • Routine Prenatal Visit     Pt reports vomiting 4 times a day but is taking medication for it which sometimes works and sometimes it does not.       Humberto Weiss is a 36 y.o.  at 10w4d   No bleeding or LOF.   Reports vomiting 3-4 times per day. Taking B6/doxylamine. Picking up phenergan today.      /69   Wt 58.5 kg (129 lb)   LMP 2021   BMI 24.37 kg/m²    Gen: NAD, well appearing  Abd: nontender  See OB Flowsheet    ASSESSMENT:   1. Hypothyroidism (acquired)    2. Supervision of other normal pregnancy, antepartum    3. Nausea and vomiting in pregnancy        PLAN:  Reviewed labs since last visit including blood type/STI testing, TSH  Will  levothyroxine refill and phenergan, plan to repeat TSH/free T4 next visit  Pt called her  and we reviewed cell free DNA/CF/SMA counseling.  She is interested in cell free DNA if it is covered by insurance. They were provided CPT codes for these tests. Will let us know if they wish to proceed and we will place an order at that time. Encouraged to call office or if sending [employer] message aware there may be a delay in response. Aware of limitations of testing, possibility of need for additional testing (such as amniocentesis), possibility of out of pocket expense, possibility of false positive/false negative results.     Reviewed common second trimester symptoms.  Reviewed precautions for signs of  labor, anticipated fetal movements.     Return in about 4 weeks (around 2021).    Problem List Items Addressed This Visit     None      Visit Diagnoses     Hypothyroidism (acquired)    -  Primary    Supervision of other normal pregnancy, antepartum        Nausea and vomiting in pregnancy              Orders Placed This Encounter   Procedures   • POC Urinalysis Dipstick     This is an external result entered through the Results Console.     Order Specific Question:   Release to patient     Answer:    Immediate   • POC Urinalysis Dipstick     This is an external result entered through the Results Console.     Order Specific Question:   Release to patient     Answer:   Immediate

## 2021-06-04 ENCOUNTER — TELEPHONE (OUTPATIENT)
Dept: OBSTETRICS AND GYNECOLOGY | Facility: CLINIC | Age: 36
End: 2021-06-04

## 2021-06-23 RX ORDER — PROMETHAZINE HYDROCHLORIDE 12.5 MG/1
12.5 TABLET ORAL EVERY 6 HOURS PRN
Qty: 30 TABLET | Refills: 0 | Status: SHIPPED | OUTPATIENT
Start: 2021-06-23 | End: 2021-09-13

## 2021-06-23 RX ORDER — DIPHENHYDRAMINE HYDROCHLORIDE 25 MG/1
25 CAPSULE ORAL EVERY 8 HOURS
Qty: 90 TABLET | Refills: 2 | Status: SHIPPED | OUTPATIENT
Start: 2021-06-23 | End: 2021-06-30

## 2021-06-23 NOTE — TELEPHONE ENCOUNTER
Med refill request. Patient 13.4 wk OB-If you refill these will you please put a note in for the pharmacy to ask the patient to call office? I have left her messages about needing to reschedule with no response.  Thank you.

## 2021-06-29 RX ORDER — LEVOTHYROXINE SODIUM 0.15 MG/1
150 TABLET ORAL DAILY
Qty: 30 TABLET | Refills: 1 | Status: SHIPPED | OUTPATIENT
Start: 2021-06-29 | End: 2021-07-26 | Stop reason: SDUPTHER

## 2021-06-30 ENCOUNTER — ROUTINE PRENATAL (OUTPATIENT)
Dept: OBSTETRICS AND GYNECOLOGY | Facility: CLINIC | Age: 36
End: 2021-06-30

## 2021-06-30 VITALS — WEIGHT: 130 LBS | BODY MASS INDEX: 24.56 KG/M2

## 2021-06-30 DIAGNOSIS — Z98.891 HISTORY OF CESAREAN SECTION: ICD-10-CM

## 2021-06-30 DIAGNOSIS — E03.9 HYPOTHYROIDISM (ACQUIRED): ICD-10-CM

## 2021-06-30 DIAGNOSIS — Z34.80 SUPERVISION OF OTHER NORMAL PREGNANCY, ANTEPARTUM: Primary | ICD-10-CM

## 2021-06-30 DIAGNOSIS — O21.9 NAUSEA AND VOMITING IN PREGNANCY: ICD-10-CM

## 2021-06-30 PROCEDURE — 0502F SUBSEQUENT PRENATAL CARE: CPT | Performed by: OBSTETRICS & GYNECOLOGY

## 2021-06-30 RX ORDER — ONDANSETRON 4 MG/1
4 TABLET, ORALLY DISINTEGRATING ORAL EVERY 8 HOURS PRN
Qty: 30 TABLET | Refills: 0 | Status: SHIPPED | OUTPATIENT
Start: 2021-06-30 | End: 2021-07-26 | Stop reason: SDUPTHER

## 2021-06-30 NOTE — PROGRESS NOTES
"Chief Complaint   Patient presents with   • Routine Prenatal Visit     Pt reports her \"eyes become red when she wakes up\" she also reoirts stomach pain this morning and vomiting every morning. She states she is unable to hold down the vomiting medication.       Humberto Weiss is a 36 y.o.  at 14w4d   Denies any issues including no bleeding or LOF.   Reports she is primarily having vomiting in the morning.  She is taking the prescribed medication (phenergan) and overall it is improved aside from the morning.   She desires genetic testing today    Wt 59 kg (130 lb)   LMP 2021   BMI 24.56 kg/m²    Gen: NAD, well appearing  Abd: nontender  Difficult to auscultate FHT, referred for US which showed +FHT  See OB Flowsheet    ASSESSMENT:   1. Supervision of other normal pregnancy, antepartum    2. Hypothyroidism (acquired)    3. History of  section    4. Nausea and vomiting in pregnancy        PLAN:  She is interested in cell free DNA, CF, SMA. Aware of limitations of testing, possibility of need for additional testing (such as amniocentesis), possibility of out of pocket expense, possibility of false positive/false negative results.    Will also send Rx for zofran as > 10 weeks for nausea. Recommend hydration (trace ketones in UA), call if symptoms not improved.     Problem List Items Addressed This Visit     None      Visit Diagnoses     Supervision of other normal pregnancy, antepartum    -  Primary    Relevant Orders    OkokzvlC50 PLUS Core - Blood,    SMN1 Copy Number Analysis    Cystic Fibrosis Diagnostic Study    US Ob 14 + Weeks Single or First Gestation    Hypothyroidism (acquired)        History of  section        Nausea and vomiting in pregnancy              Orders Placed This Encounter   Procedures   • US Ob 14 + Weeks Single or First Gestation     Standing Status:   Future     Standing Expiration Date:   2022     Order Specific Question:   Reason for Exam:     Answer:   " anatomy   • [id number] PLUS Core - Blood,     Order Specific Question:   [facility] Date of last menstrual period or estimated date of delivery (corresponding to calculation method):     Answer:   12/25/2021     Order Specific Question:   [facility] Gestational age calculation method:     Answer:   KATIE BABCOCK   • SMN1 Copy Number Analysis     Order Specific Question:   Release to patient     Answer:   Immediate   • Cystic Fibrosis Diagnostic Study     Order Specific Question:   Release to patient     Answer:   Immediate

## 2021-07-05 LAB
CFDNA.FET/CFDNA.TOTAL SFR FETUS: NORMAL %
CITATION REF LAB TEST: NORMAL
FET 13+18+21+X+Y ANEUP PLAS.CFDNA: NEGATIVE
FET CHR 21 TS PLAS.CFDNA QL: NEGATIVE
FET SEX PLAS.CFDNA DOSAGE CFDNA: NORMAL
FET TS 13 RISK PLAS.CFDNA QL: NEGATIVE
FET TS 18 RISK WBC.DNA+CFDNA QL: NEGATIVE
GA EST FROM CONCEPTION DATE: NORMAL D
GESTATIONAL AGE > 9:: YES
LAB DIRECTOR NAME PROVIDER: NORMAL
LAB DIRECTOR NAME PROVIDER: NORMAL
LABORATORY COMMENT REPORT: NORMAL
LIMITATIONS OF THE TEST: NORMAL
NEGATIVE PREDICTIVE VALUE: NORMAL
NOTE: NORMAL
PERFORMANCE CHARACTERISTICS: NORMAL
POSITIVE PREDICTIVE VALUE: NORMAL
REF LAB TEST METHOD: NORMAL
TEST PERFORMANCE INFO SPEC: NORMAL

## 2021-07-06 ENCOUNTER — TELEPHONE (OUTPATIENT)
Dept: OBSTETRICS AND GYNECOLOGY | Facility: CLINIC | Age: 36
End: 2021-07-06

## 2021-07-06 LAB
CFTR MUT ANL BLD/T: NORMAL
LABORATORY COMMENT REPORT: NORMAL

## 2021-07-06 NOTE — TELEPHONE ENCOUNTER
Patient calling for labs results. Saleem on MY Chart and like to discuss. Thank You. Pt Ph [phone] or [phone]

## 2021-07-07 ENCOUNTER — TELEPHONE (OUTPATIENT)
Dept: OBSTETRICS AND GYNECOLOGY | Facility: CLINIC | Age: 36
End: 2021-07-07

## 2021-07-07 NOTE — TELEPHONE ENCOUNTER
----- Message from Susana Ramirez MD sent at 7/6/2021  9:39 AM EDT -----  Please let patient know that aneuploidy screening was negative for trisomy 21/18/13.  If she wishes to know sex, testing shows consistent with female.      07/07/21  Pt has been informed of negative trisomy 21/18/13 and aware of baby's gender being a female. Patient desires to interrupt pregnancy and is wondering if it's too late to interrupt pregnancy.

## 2021-07-07 NOTE — TELEPHONE ENCOUNTER
----- Message from Susana Ramirez MD sent at 7/6/2021  9:29 PM EDT -----  Please let patient know that her testing for the 32 mutations analyzed for CF was negative (normal).      07/07/21  Pt has been informed negative CF results

## 2021-07-07 NOTE — TELEPHONE ENCOUNTER
07/07/21  Spoke to patient and gave her (verbally given by APRN) [facility] phone number to call.

## 2021-07-08 LAB
CLINICAL GENETICS COUNSELING NOTE: NORMAL
CLINICAL INFO: NORMAL
ETHNIC BACKGROUND STATED: NORMAL
LAB DIRECTOR NAME PROVIDER: NORMAL
LABORATORY COMMENT REPORT: NORMAL
REASON FOR REFERRAL (NARRATIVE): NORMAL
REF LAB TEST METHOD: NORMAL
SMN1 GENE MUT ANL BLD/T: NORMAL
SPECIMEN SOURCE: NORMAL
TEST PERFORMANCE INFO SPEC: NORMAL
TEST PERFORMANCE INFO SPEC: NORMAL

## 2021-07-28 RX ORDER — ONDANSETRON 4 MG/1
4 TABLET, ORALLY DISINTEGRATING ORAL EVERY 8 HOURS PRN
Qty: 30 TABLET | Refills: 0 | Status: SHIPPED | OUTPATIENT
Start: 2021-07-28 | End: 2021-09-13

## 2021-07-28 RX ORDER — LEVOTHYROXINE SODIUM 0.15 MG/1
150 TABLET ORAL DAILY
Qty: 30 TABLET | Refills: 1 | Status: SHIPPED | OUTPATIENT
Start: 2021-07-28 | End: 2021-09-03 | Stop reason: SDUPTHER

## 2021-08-25 RX ORDER — LEVOTHYROXINE SODIUM 0.15 MG/1
150 TABLET ORAL DAILY
Qty: 30 TABLET | Refills: 1 | OUTPATIENT
Start: 2021-08-25

## 2021-08-25 NOTE — TELEPHONE ENCOUNTER
Appears patient may be seeing [facility]? Please call to confirm and if so can close out visits with us. Thanks!

## 2021-09-03 ENCOUNTER — TELEPHONE (OUTPATIENT)
Dept: OBSTETRICS AND GYNECOLOGY | Facility: CLINIC | Age: 36
End: 2021-09-03

## 2021-09-03 RX ORDER — LEVOTHYROXINE SODIUM 0.15 MG/1
150 TABLET ORAL DAILY
Qty: 90 TABLET | Refills: 0 | Status: SHIPPED | OUTPATIENT
Start: 2021-09-03 | End: 2021-09-07 | Stop reason: SDUPTHER

## 2021-09-03 NOTE — TELEPHONE ENCOUNTER
Please call patient with regards to WARSTUFF messages from 9/3/21.      I completely respect her decision and appreciate that it was a difficult one to make.  I am more than happy to provide her gynecologic care.  I will send a refill for her levothyroxine to her pharmacy for 3 months to bridge to her endocrinology appointment.  We do not have anyone in our office named Bennett.  If she feels she is having gynecologic issues, needs follow up desires contraception, or wishes to discuss the issues further, she is welcome to make a GYN appointment. Thank you!

## 2021-09-07 DIAGNOSIS — E03.9 ACQUIRED HYPOTHYROIDISM: Primary | ICD-10-CM

## 2021-09-07 RX ORDER — LEVOTHYROXINE SODIUM 0.15 MG/1
150 TABLET ORAL DAILY
Qty: 90 TABLET | Refills: 1 | Status: SHIPPED | OUTPATIENT
Start: 2021-09-07 | End: 2023-01-12

## 2021-09-13 ENCOUNTER — OFFICE VISIT (OUTPATIENT)
Dept: OBSTETRICS AND GYNECOLOGY | Facility: CLINIC | Age: 36
End: 2021-09-13

## 2021-09-13 VITALS
DIASTOLIC BLOOD PRESSURE: 70 MMHG | SYSTOLIC BLOOD PRESSURE: 106 MMHG | WEIGHT: 136 LBS | HEART RATE: 54 BPM | BODY MASS INDEX: 25.68 KG/M2 | HEIGHT: 61 IN

## 2021-09-13 DIAGNOSIS — Z31.69 PRE-CONCEPTION COUNSELING: Primary | ICD-10-CM

## 2021-09-13 PROCEDURE — 99212 OFFICE O/P EST SF 10 MIN: CPT | Performed by: OBSTETRICS & GYNECOLOGY

## 2021-09-13 NOTE — PATIENT INSTRUCTIONS
[facility]   [place], [place]   [phone]    [facility]  [place], [place]   [phone]

## 2021-09-14 NOTE — PROGRESS NOTES
SUBJECTIVE:   Chief Complaint   Patient presents with   • Conception     Pt had an elective  on 21 and she now desires to concieve again        Humberto Weiss is a 36 y.o.  who presents for discussion or preconception counseling, family planning. She is ready to conceive again. Patient's last menstrual period was 2021 (exact date). This period was a bit longer than her typical (approx 7 days, usually lasts <5).  She is here with her  and her daughter. To the best of their knowledge they do not have a history of a sex-linked genetic disease or a history of genetic disease.    She has questions regarding genetic testing, sex chromosome testing in pregnancy.     Past Medical History:   Diagnosis Date   • Hypothyroidism      Past Surgical History:   Procedure Laterality Date   •  SECTION       OB History    Para Term  AB Living   6 1 1   4 1   SAB TAB Ectopic Molar Multiple Live Births   4         1      # Outcome Date GA Lbr Edward/2nd Weight Sex Delivery Anes PTL Lv   6             5 Term 17 37w0d  2268 g (5 lb)  CS-Unspec   CLARENCE   4 2017           3 2016           2 2015           1 2014              Social History     Tobacco Use   • Smoking status: Never Smoker   • Smokeless tobacco: Never Used   Substance Use Topics   • Alcohol use: Not Currently   • Drug use: Never     Family History   Problem Relation Age of Onset   • Breast cancer Neg Hx    • Ovarian cancer Neg Hx    • Uterine cancer Neg Hx    • Colon cancer Neg Hx    • Deep vein thrombosis Neg Hx    • Pulmonary embolism Neg Hx      Current Outpatient Medications on File Prior to Visit   Medication Sig Dispense Refill   • levothyroxine (SYNTHROID, LEVOTHROID) 150 MCG tablet Take 1 tablet by mouth Daily. 90 tablet 1   • [DISCONTINUED] doxylamine (UNISOM) 25 MG tablet Take 0.5 tablets by mouth At Night As Needed for Nausea. 45 tablet 2   • [DISCONTINUED] doxylamine-pyridoxine  "(Diclegis) 10-10 MG tablet delayed-release EC tablet 2 PO QHS, if not improved in 2 days, 1 POQAM and 2PO QHS.  If no improvement in 2 days, then 1 PO QAM, 1 PO in the afternoon, and 2 PO  tablet 3   • [DISCONTINUED] famotidine (Pepcid) 40 MG tablet Take 1 tablet by mouth 2 (Two) Times a Day. For acid reflux 60 tablet 3   • [DISCONTINUED] folic acid (FOLVITE) 1 MG tablet Take 1 mg by mouth Daily.     • [DISCONTINUED] ondansetron ODT (Zofran ODT) 4 MG disintegrating tablet Place 1 tablet on the tongue Every 8 (Eight) Hours As Needed for Nausea or Vomiting. 30 tablet 0   • [DISCONTINUED] prenatal vitamin (prenatal, CLASSIC, vitamin) tablet Take  by mouth Daily.     • [DISCONTINUED] promethazine (PHENERGAN) 12.5 MG tablet Take 1 tablet by mouth Every 6 (Six) Hours As Needed for Nausea or Vomiting. 30 tablet 0   • [DISCONTINUED] sucralfate (Carafate) 1 g tablet Take 1 tablet by mouth 4 (Four) Times a Day. Before meals and at bedtime 60 tablet 0     No current facility-administered medications on file prior to visit.     No Known Allergies     Review of Systems  See HPI    OBJECTIVE:   Vitals:    09/13/21 1551   BP: 106/70   Pulse: 54   Weight: 61.7 kg (136 lb)   Height: 154.9 cm (60.98\")      Physical Exam  Constitutional:       General: She is not in acute distress.     Appearance: She is well-developed. She is not diaphoretic.   HENT:      Head: Normocephalic and atraumatic.   Eyes:      General: No scleral icterus.     Extraocular Movements: Extraocular movements intact.   Pulmonary:      Effort: Pulmonary effort is normal. No respiratory distress.   Skin:     General: Skin is warm and dry.   Neurological:      General: No focal deficit present.      Mental Status: She is alert and oriented to person, place, and time.   Psychiatric:         Mood and Affect: Mood normal.         Behavior: Behavior normal.         Thought Content: Thought content normal.         Judgment: Judgment normal. "         ASSESSMENT/PLAN:     ICD-10-CM ICD-9-CM   1. Pre-conception counseling  Z31.69 V26.49     Counseled patient regarding pre conception recommendations including resuming prenatal vitamins.  We reviewed that IVF can provide pre-implantation genetic screening for medically indicated purposes.  We reviewed the ASRM statement on sex selection/non medically indicated PGS.  I provided numbers for the local JUD offices and reviewed that the offices may not offer non medically indicated PGS and/or sperm selection, so they would need to specifically inquire regarding this matter.  She and her  expressed understanding and had no further questions about this time.       No orders of the defined types were placed in this encounter.      No follow-ups on file.

## 2021-12-06 RX ORDER — LEVOTHYROXINE SODIUM 0.15 MG/1
150 TABLET ORAL DAILY
Qty: 90 TABLET | Refills: 1 | OUTPATIENT
Start: 2021-12-06

## 2021-12-06 NOTE — TELEPHONE ENCOUNTER
Message left at the [facility] pharmacy that this prescription was sent to our office by mistake. This prescription should go to her PCP, James Epley, APRN.

## 2021-12-06 NOTE — TELEPHONE ENCOUNTER
Med refill. James patient. This prescription was filled by James Epley, APRN on 9/3/2021. Unsure why it was sent to this office.

## 2022-02-16 ENCOUNTER — TELEPHONE (OUTPATIENT)
Dept: OBSTETRICS AND GYNECOLOGY | Facility: CLINIC | Age: 37
End: 2022-02-16

## 2022-02-16 NOTE — TELEPHONE ENCOUNTER
Called & left voicemail for pt to return call. This is regarding her MyChart request for an annual. She is not due until 5/5/22. If she wants to see Dr. Ramirez at the [place] location, it will be late August (there are sooner appointments at [facility] or [facility]). She can also schedule with Manuel

## 2022-12-12 NOTE — TELEPHONE ENCOUNTER
12/12/22 0695 Jasmine Flores, FRANCES     Stroke Education Note     The following information has been reviewed with the patient and family: Patient very drowsy but attentive     1. Warning signs of stroke     2. Calling 911 if having warning signs of stroke     3. All modifiable risk factors: hypertension, CAD, atrial fib, diabetes, hypercholesterolemia, smoking, substance abuse, diet, physical inactivity, obesity, sleep apnea.     4. Patient's risk factors for stroke which include: HLD, HTN, Sleep apnea     5. Follow-up plan for after discharge     6. Discharge medications which include: HTN, Anti-coagulation, statin     In addition, the above information was given to the patient and family in writing as a part of the [facility] Stroke Class Handout.     Learner's response to risk factors / lifestyle modification education: Committment to change Activation Taking steps      Jasmine Flores RN,             Please call patient and let her know that her labs were mostly within normal limits but based on her TSH, I would recommend increasing her levothyroxine to 150mcg dailiy (rx sent).    Her testing for STIs was negative, blood type is O positive and hemoglobin is normal at 12.3 Thanks!

## 2023-01-12 ENCOUNTER — INITIAL PRENATAL (OUTPATIENT)
Dept: OBSTETRICS AND GYNECOLOGY | Facility: CLINIC | Age: 38
End: 2023-01-12
Payer: COMMERCIAL

## 2023-01-12 VITALS — WEIGHT: 135.4 LBS | SYSTOLIC BLOOD PRESSURE: 112 MMHG | DIASTOLIC BLOOD PRESSURE: 63 MMHG | BODY MASS INDEX: 25.6 KG/M2

## 2023-01-12 DIAGNOSIS — Z32.00 POSSIBLE PREGNANCY, NOT CONFIRMED: Primary | ICD-10-CM

## 2023-01-12 LAB
B-HCG UR QL: POSITIVE
EXPIRATION DATE: ABNORMAL
INTERNAL NEGATIVE CONTROL: NEGATIVE
INTERNAL POSITIVE CONTROL: POSITIVE
Lab: ABNORMAL

## 2023-01-12 PROCEDURE — 81025 URINE PREGNANCY TEST: CPT | Performed by: OBSTETRICS & GYNECOLOGY

## 2023-01-12 PROCEDURE — 0501F PRENATAL FLOW SHEET: CPT | Performed by: OBSTETRICS & GYNECOLOGY

## 2023-01-12 RX ORDER — LEVOTHYROXINE SODIUM 0.07 MG/1
75 TABLET ORAL DAILY
COMMUNITY
Start: 2022-10-27 | End: 2023-01-23 | Stop reason: SDUPTHER

## 2023-01-12 RX ORDER — MULTIPLE VITAMINS W/ MINERALS TAB 9MG-400MCG
1 TAB ORAL DAILY
COMMUNITY

## 2023-01-12 RX ORDER — FOLIC ACID 1 MG/1
1 TABLET ORAL DAILY
COMMUNITY

## 2023-01-12 NOTE — PROGRESS NOTES
"Initial OB Visit    Chief Complaint   Patient presents with   • Initial Prenatal Visit      Humberto Weiss is being seen today for her first obstetrical visit.  She is a 37 y.o.    5w6d gestation by sure LMP  FOB: Cuco Duggan, he is the father of her daughter  This is a planned pregnancy.   OB History    Para Term  AB Living   7 1 1   5 1   SAB IAB Ectopic Molar Multiple Live Births   4 1       1      # Outcome Date GA Lbr Edward/2nd Weight Sex Delivery Anes PTL Lv   7 Current            6 IAB            5 Term 17 37w0d  2268 g (5 lb)  CS-Unspec   CLARENCE   4 2017           3 2016           2 2015           1 2014               Current obstetric complaints: none  - levothyroxine 75mcg- through PCP  Prior obstetric issues, potential pregnancy concerns: Delivered by CS in [place] due to \"baby's head not able to turn\" (second stage arrest); had IAB last pregnancy  Family history of genetic issues (includes FOB): denies; reports a h/o 4 SABs \"very early\" - like \"2 weeks\"- never required D&C or medication.  She reports that she saw MFM in [place] and was told everything was fine.    Prior infections concerning in pregnancy (Rash, fever since LMP): denies  Varicella Hx:reports vaccination  Prior genetic testing: negative CF/SMA last pregnancy  History of abnormal pap smears: denies  History of STIs: denies  History of HSV in self or partner? denies    Past Medical History:   Diagnosis Date   • Hypothyroidism        Past Surgical History:   Procedure Laterality Date   •  SECTION           Current Outpatient Medications:   •  folic acid (FOLVITE) 1 MG tablet, Take 1 mg by mouth Daily., Disp: , Rfl:   •  levothyroxine (SYNTHROID, LEVOTHROID) 75 MCG tablet, Take 75 mcg by mouth Daily., Disp: , Rfl:   •  multivitamin with minerals (ONE-A-DAY WOMENS PO), Take 1 tablet by mouth Daily., Disp: , Rfl:     No Known Allergies    Social History     Socioeconomic History   • Marital " status:    Tobacco Use   • Smoking status: Never   • Smokeless tobacco: Never   Substance and Sexual Activity   • Alcohol use: Not Currently   • Drug use: Never   • Sexual activity: Yes     Partners: Male     Birth control/protection: None       Family History   Problem Relation Age of Onset   • Breast cancer Neg Hx    • Ovarian cancer Neg Hx    • Uterine cancer Neg Hx    • Colon cancer Neg Hx    • Deep vein thrombosis Neg Hx    • Pulmonary embolism Neg Hx        Review of systems    Review of Systems - Negative except per HPI for 10 point review of systems including General, Psychological, Ophthalmic, ENT, Endocrine, Respiratory, Cardiovascular, Gastrointestinal, Genito-Urinary, Musculoskeletal, Neurological, Dermatological        Objective    /63   Wt 61.4 kg (135 lb 6.4 oz)   LMP 12/02/2022   BMI 25.60 kg/m²       General Appearance:    Alert, cooperative, in no acute distress, habitus normal   Head:    Normocephalic, without obvious abnormality, atraumatic   Eyes:            Lids and lashes normal, conjunctivae and sclerae normal, no   icterus, no pallor, corneas clear   Ears:    Ears appear intact with no abnormalities noted       Neck:   No adenopathy, supple, trachea midline, no thyromegaly   Back:     No kyphosis present, no scoliosis present,                       Lungs:     Clear to auscultation,respirations regular, even and                   unlabored    Heart:    Regular rhythm and normal rate, normal S1 and S2, no            murmur, no gallop, no rub, no click   Breast Exam:    No masses, No nipple discharge   Abdomen:     Normal bowel sounds, no masses, no organomegaly, soft        non-tender, non-distended, no guarding, no rebound                 tenderness   Genitalia:    Vulva - BUS-WNL, NEFG    Vagina - No discharge, No bleeding    Cervix - No Lesions, closed     Uterus - Consistent with 6 weeks    Adnexa - No masses, NT    Pelvimetry - clinically adequate, gynecoid pelvis      Extremities:   Moves all extremities well, no edema, no cyanosis, no              redness   Pulses:   Pulses palpable and equal bilaterally   Skin:   No bleeding, bruising or rash   Lymph nodes:   No palpable adenopathy   Neurologic:   Sensation intact, A&O times 3      Assessment  Pregnancy at 5w6d   Hypothyroidism  H/o recurrent pregnancy loss  Nausea, vomiting in pregnancy   H/o CS    Plan    Initial labs drawn, GC/CHL screen done  Patient is on Prenatal vitamins  Problem list reviewed and updated.  Reviewed nature of practice and hospital.  Reviewed recommended follow up, importance of compliance with care. We reviewed testing in pregnancy including HIV testing and urine drug screen.    Reviewed aneuploidy screening and CF/SMA screening.  We reviewed limitations of testing, possibility of false positive/negative results, possible need for other tests as indicated.    Counseled on limitations of ultrasound in pregnancy in detecting aneuploidy/fetal anomalies    Reviewed nausea, vomiting. Reviewed Body mass index is 25.6 kg/m²..  In pregnancy, her recommended weight gain is 35lbs. If she were to lose more weight or be unable to maintain hydration, recommend immediate follow up     H/o recurrent pregnancy loss:   Reports normal work up in prior pregnancy.    Hypothyroidism:   Check TSH, free T4 and adjust accordingly.     All questions answered.   Return in about 1 week (around 1/19/2023) for dating US, 4 weeks OB visit.      Susana Ramirez MD

## 2023-01-13 LAB
AMPHETAMINES UR QL SCN: NEGATIVE NG/ML
BARBITURATES UR QL SCN: NEGATIVE NG/ML
BENZODIAZ UR QL: NEGATIVE NG/ML
BZE UR QL: NEGATIVE NG/ML
CANNABINOIDS UR QL SCN: NEGATIVE NG/ML
METHADONE UR QL SCN: NEGATIVE NG/ML
OPIATES UR QL: NEGATIVE NG/ML
PCP UR QL: NEGATIVE NG/ML
PROPOXYPH UR QL SCN: NEGATIVE NG/ML

## 2023-01-14 LAB
BACTERIA UR CULT: NORMAL
BACTERIA UR CULT: NORMAL
C TRACH RRNA SPEC QL NAA+PROBE: NEGATIVE
N GONORRHOEA RRNA SPEC QL NAA+PROBE: NEGATIVE
T VAGINALIS RRNA SPEC QL NAA+PROBE: NEGATIVE

## 2023-01-23 ENCOUNTER — TELEPHONE (OUTPATIENT)
Dept: OBSTETRICS AND GYNECOLOGY | Facility: CLINIC | Age: 38
End: 2023-01-23
Payer: COMMERCIAL

## 2023-01-23 RX ORDER — LEVOTHYROXINE SODIUM 0.1 MG/1
100 TABLET ORAL DAILY
Qty: 30 TABLET | Refills: 1 | Status: SHIPPED | OUTPATIENT
Start: 2023-01-23 | End: 2023-01-24

## 2023-01-23 NOTE — TELEPHONE ENCOUNTER
Agree with plan.  If she has light bleeding, okay to come to office for repeat ultrasound within the next couple days.  Synthroid increased to 100mcg, recommend repeat in 4-6 weeks.  Rx sent for 4 weeks with one refill. Thanks!

## 2023-01-23 NOTE — TELEPHONE ENCOUNTER
Spoke with Humberto to let her know Levothyroxine 100 mcg to your [facility] for 4 weeks with 1 refill. Plan to repeat the lab work n 4 to 6 weeks. She still has some light blood when she wipes. I told her this is ok and should turn brown and stop. If it does not, please call the office within a couple of days and Dr Ramirez will probably order an US. Thank you.

## 2023-01-23 NOTE — TELEPHONE ENCOUNTER
Pt  called in to say she had some bleeding on the toilet paper just now when she went to [facility]. It is only when she wiped. I asked her to put on a pad to monitor for any additional bleeding. She did not have any bleeding after transvaginal US 1/19/23 and they have not recently had intercourse. I told him to have her wear a pad and monitor for bleeding. If she fills the pad with blood, let us know and go to [facility] ED. Hopefully she will stop or it turn brown. Let us know.    He asked about her labs. Her TSH was up to 10.3. Requesting up dose of levothyroxine. Told him Dr Ramirez has not reviewed results, but I will send a message. Thank you.

## 2023-01-24 ENCOUNTER — TELEPHONE (OUTPATIENT)
Dept: OBSTETRICS AND GYNECOLOGY | Facility: CLINIC | Age: 38
End: 2023-01-24
Payer: COMMERCIAL

## 2023-01-24 LAB
ABO GROUP BLD: ABNORMAL
BASOPHILS # BLD AUTO: 0 X10E3/UL (ref 0–0.2)
BASOPHILS NFR BLD AUTO: 0 %
BLD GP AB SCN SERPL QL: NEGATIVE
EOSINOPHIL # BLD AUTO: 0.2 X10E3/UL (ref 0–0.4)
EOSINOPHIL NFR BLD AUTO: 2 %
ERYTHROCYTE [DISTWIDTH] IN BLOOD BY AUTOMATED COUNT: 13.7 % (ref 11.7–15.4)
HBV SURFACE AG SERPL QL IA: NEGATIVE
HCT VFR BLD AUTO: 34.9 % (ref 34–46.6)
HCV AB S/CO SERPL IA: <0.1 S/CO RATIO (ref 0–0.9)
HGB BLD-MCNC: 11.3 G/DL (ref 11.1–15.9)
HIV 1+2 AB+HIV1 P24 AG SERPL QL IA: NON REACTIVE
IMM GRANULOCYTES # BLD AUTO: 0.1 X10E3/UL (ref 0–0.1)
IMM GRANULOCYTES NFR BLD AUTO: 1 %
LYMPHOCYTES # BLD AUTO: 3.4 X10E3/UL (ref 0.7–3.1)
LYMPHOCYTES NFR BLD AUTO: 32 %
MCH RBC QN AUTO: 29.7 PG (ref 26.6–33)
MCHC RBC AUTO-ENTMCNC: 32.4 G/DL (ref 31.5–35.7)
MCV RBC AUTO: 92 FL (ref 79–97)
MONOCYTES # BLD AUTO: 0.9 X10E3/UL (ref 0.1–0.9)
MONOCYTES NFR BLD AUTO: 9 %
NEUTROPHILS # BLD AUTO: 5.8 X10E3/UL (ref 1.4–7)
NEUTROPHILS NFR BLD AUTO: 56 %
PLATELET # BLD AUTO: 124 X10E3/UL (ref 150–450)
RBC # BLD AUTO: 3.81 X10E6/UL (ref 3.77–5.28)
RH BLD: POSITIVE
RPR SER QL: NON REACTIVE
RUBV IGG SERPL IA-ACNC: 3.23 INDEX
T4 FREE SERPL-MCNC: 1.15 NG/DL (ref 0.82–1.77)
TSH SERPL DL<=0.005 MIU/L-ACNC: 10.3 UIU/ML (ref 0.45–4.5)
WBC # BLD AUTO: 10.4 X10E3/UL (ref 3.4–10.8)

## 2023-01-24 RX ORDER — LEVOTHYROXINE SODIUM 0.1 MG/1
100 TABLET ORAL DAILY
Qty: 90 TABLET | Refills: 0 | Status: SHIPPED | OUTPATIENT
Start: 2023-01-24

## 2023-01-24 NOTE — TELEPHONE ENCOUNTER
Patients spouse calling in says patient is still spotting , says she called in yesterday and spoke to Ms.tosha was told to come back . Says that when she wipes she is still seeing the blood on the tissue. Do you have any advice on what she should ?          Please advise,  Thank you

## 2023-01-24 NOTE — TELEPHONE ENCOUNTER
Med refill. 7wk4d. OB & US 2/10/23. You reviewed her TSH and increased her to 100 mcg yesterday with plan to repeat labs late February. She is requesting a 90 day supply instead of 30 day with 1 refill. [facility] pharmacy on file. Thank you.

## 2023-01-24 NOTE — TELEPHONE ENCOUNTER
I will send a 90 day supply, but we will repeat her values in 4-6 weeks and may adjust dose again (hence why I sent in a smaller supply). Thanks!

## 2023-01-25 ENCOUNTER — OFFICE VISIT (OUTPATIENT)
Dept: OBSTETRICS AND GYNECOLOGY | Facility: CLINIC | Age: 38
End: 2023-01-25
Payer: COMMERCIAL

## 2023-01-25 VITALS
HEART RATE: 85 BPM | SYSTOLIC BLOOD PRESSURE: 100 MMHG | HEIGHT: 61 IN | WEIGHT: 135.4 LBS | DIASTOLIC BLOOD PRESSURE: 68 MMHG | BODY MASS INDEX: 25.57 KG/M2

## 2023-01-25 DIAGNOSIS — O02.1 EMBRYONIC DEMISE: Primary | ICD-10-CM

## 2023-01-25 PROCEDURE — 99213 OFFICE O/P EST LOW 20 MIN: CPT | Performed by: OBSTETRICS & GYNECOLOGY

## 2023-01-25 RX ORDER — LEVOTHYROXINE SODIUM 0.1 MG/1
100 TABLET ORAL DAILY
Qty: 90 TABLET | Refills: 0 | OUTPATIENT
Start: 2023-01-25

## 2023-01-25 NOTE — PROGRESS NOTES
"SUBJECTIVE:   Humberto Weiss is a 37 y.o.  who presents today with embryonic demise diagnosed based on US last week showing +FHT and today showing absent FHT.  She reports some light bleeding and no cramping.  Her prior pregnancy history is significant for miscarriages and IAB.  She reports that she only had 2 SABs prior to her pregnancy - one in [place] and one in [facility]. She took medication to induce one in [place].      Past Medical History:   Diagnosis Date   • Hypothyroidism       Past Surgical History:   Procedure Laterality Date   •  SECTION        Social History     Tobacco Use   • Smoking status: Never   • Smokeless tobacco: Never   Substance Use Topics   • Alcohol use: Not Currently   • Drug use: Never        OB History    Para Term  AB Living   7 1 1   5 1   SAB IAB Ectopic Molar Multiple Live Births   4 1       1      # Outcome Date GA Lbr Edward/2nd Weight Sex Delivery Anes PTL Lv   7 Current            6 IAB            5 Term 17 37w0d  2268 g (5 lb)  CS-Unspec   CLARENCE   4 2017           3 2016           2 2015           1 2014               OBJECTIVE:   Vitals:    23 1457   BP: 100/68   Pulse: 85   Weight: 61.4 kg (135 lb 6.4 oz)   Height: 154.9 cm (60.98\")      Physical Examination:    General appearance - alert, well appearing, and in no distress    Chest - no tachypnea, retractions or cyanosis   CV- well perfused, no cyanosis   Abdomen - soft, nontender, nondistended, no masses or organomegaly    Neurological - alert, oriented, normal speech, no focal findings or movement disorder noted   Musculoskeletal - no joint tenderness, deformity or swelling    Extremities - no pedal edema noted   Skin - normal coloration and turgor, no rashes, no suspicious skin lesions noted    Lab Results   Component Value Date    HCGQUANT Comment 2021        O Positive    Ultrasound - see report    ASSESSMENT:   Embryonic demise    PLAN:   Patient was " informed of the diagnosis of embryonic demise.  We discussed that given absent fetal heart tones today that were present last week, this pregnancy is unsuccessful. Patient's questions were answered and condolences were offered. Patient was counseled on options including expectant management, dilation and curettage, and medical management with misoprostol.  Patient was informed of the risks of each procedure including but not limited to bleeding, infection, risks of anesthesia, risks of injury to uterus (such as uterine perforation), need for other surgeries and/or procedures.  She was informed that medical management is often successful but ~15% of patients (exact risk depending on gestational age at time of demise) may require a dilation and curettage secondary to failure of the medication.   Patient chose to pursue expectant management treatment option. She was informed to return to office in one week. Call with heavy bleeding or if she changes her mind and desires Misoprostol or D&C.  Rh positive    All questions were answered to patient's apparent satisfaction upon conclusion of our discussion.  Patient will follow up in 1 week

## 2023-02-01 ENCOUNTER — TELEPHONE (OUTPATIENT)
Dept: OBSTETRICS AND GYNECOLOGY | Facility: CLINIC | Age: 38
End: 2023-02-01
Payer: COMMERCIAL

## 2023-02-01 NOTE — TELEPHONE ENCOUNTER
Patient has taken medication Misoprostol. Declines any heavy bleeding and/or cramping at this time. Patient has agreed to keep appointments on 2/10/2023.

## 2023-02-01 NOTE — TELEPHONE ENCOUNTER
Can you please check and make sure she was able to get and take the misoprostol?  Please check if she had moderate to heavy bleeding after as well.  If she is not having continued heavy bleeding, pain, fever or other concerns, okay to keep appt 2/10. Thanks!

## 2023-02-01 NOTE — TELEPHONE ENCOUNTER
Patient had recent miscarriage on 1/25/2023, Patient wanting to now if she needs to be seen this week ? Patient is schedule for follow up and Ultrasound for 2/10/2023.        Please advise,  Thank you

## 2023-02-15 ENCOUNTER — OFFICE VISIT (OUTPATIENT)
Dept: OBSTETRICS AND GYNECOLOGY | Facility: CLINIC | Age: 38
End: 2023-02-15
Payer: COMMERCIAL

## 2023-02-15 VITALS
WEIGHT: 136 LBS | BODY MASS INDEX: 25.68 KG/M2 | DIASTOLIC BLOOD PRESSURE: 69 MMHG | HEIGHT: 61 IN | SYSTOLIC BLOOD PRESSURE: 103 MMHG

## 2023-02-15 DIAGNOSIS — O03.9 MISCARRIAGE: Primary | ICD-10-CM

## 2023-02-15 PROCEDURE — 99213 OFFICE O/P EST LOW 20 MIN: CPT | Performed by: OBSTETRICS & GYNECOLOGY

## 2023-02-15 NOTE — PROGRESS NOTES
SUBJECTIVE:   Chief Complaint   Patient presents with   • Gynecologic Exam   • Follow-up     MEGAN Weiss is a 37 y.o.  who presents for follow up from miscarriage, embryonic demise.  Reports her bleeding has stopped. She is not having pain. She was tracking ovulation and thinks she may have ovulated last week.  She reports after a miscarriage in [place] that discovered her hypothyroidism; thinks they may have had a karyotype at that time.  She has had a total of 3 SABs - two prior to her daughter and this last one.     Past Medical History:   Diagnosis Date   • Hypothyroidism      Past Surgical History:   Procedure Laterality Date   •  SECTION       OB History    Para Term  AB Living   7 1 1   5 1   SAB IAB Ectopic Molar Multiple Live Births   4 1       1      # Outcome Date GA Lbr Edward/2nd Weight Sex Delivery Anes PTL Lv   7             6 IAB            5 Term 17 37w0d  2268 g (5 lb)  CS-Unspec   CLARENCE   4 2017           3 2016           2 2015           1 2014              Social History     Tobacco Use   • Smoking status: Never   • Smokeless tobacco: Never   Substance Use Topics   • Alcohol use: Not Currently   • Drug use: Never     Family History   Problem Relation Age of Onset   • Breast cancer Neg Hx    • Ovarian cancer Neg Hx    • Uterine cancer Neg Hx    • Colon cancer Neg Hx    • Deep vein thrombosis Neg Hx    • Pulmonary embolism Neg Hx      Current Outpatient Medications on File Prior to Visit   Medication Sig Dispense Refill   • folic acid (FOLVITE) 1 MG tablet Take 1 mg by mouth Daily.     • levothyroxine (SYNTHROID, LEVOTHROID) 100 MCG tablet TAKE 1 TABLET BY MOUTH DAILY 90 tablet 0   • miSOPROStol (CYTOTEC) 200 MCG tablet Place 800mcg per vagina once. 4 tablet 0   • multivitamin with minerals tablet tablet Take 1 tablet by mouth Daily.       No current facility-administered medications on file prior to visit.     No Known  "Allergies     Review of Systems      OBJECTIVE:   Vitals:    02/15/23 1151   BP: 103/69   Weight: 61.7 kg (136 lb)   Height: 154.9 cm (60.98\")      Physical Exam  Constitutional:       General: She is not in acute distress.     Appearance: She is well-developed. She is not diaphoretic.   HENT:      Head: Normocephalic and atraumatic.   Eyes:      General: No scleral icterus.     Extraocular Movements: Extraocular movements intact.   Pulmonary:      Effort: Pulmonary effort is normal. No respiratory distress.   Skin:     General: Skin is warm and dry.   Neurological:      General: No focal deficit present.      Mental Status: She is alert and oriented to person, place, and time.   Psychiatric:         Mood and Affect: Mood normal.         Behavior: Behavior normal.         Thought Content: Thought content normal.         Judgment: Judgment normal.         ASSESSMENT/PLAN:     ICD-10-CM ICD-9-CM   1. Miscarriage  O03.9 634.90       Reviewed ultrasound.  Reviewed anticipated return to normal cycles and indications for follow up  Will get APLAS and repeat thyroid testing today  Encouraged to continue PNV    Orders Placed This Encounter   Procedures   • TSH Rfx On Abnormal To Free T4     Order Specific Question:   Release to patient     Answer:   Routine Release   • Anticardiolipin Antibody, IgG / M, Qn     Order Specific Question:   Release to patient     Answer:   Routine Release   • Lupus Anticoagulant Panel     Order Specific Question:   Release to patient     Answer:   Routine Release   • Lupus Anticoagulant     Order Specific Question:   Release to patient     Answer:   Routine Release       No follow-ups on file.            "

## 2023-02-16 LAB
CARDIOLIPIN IGG SER IA-ACNC: <9 GPL U/ML (ref 0–14)
CARDIOLIPIN IGM SER IA-ACNC: 9 MPL U/ML (ref 0–12)
TSH SERPL DL<=0.005 MIU/L-ACNC: 1.76 UIU/ML (ref 0.45–4.5)

## 2023-02-17 LAB
APTT SCREEN TO CONFIRM RATIO: 1.06 RATIO (ref 0–1.34)
CONFIRM APTT/NORMAL: 35.9 SEC (ref 0–47.6)
LA 2 SCREEN W REFLEX-IMP: NORMAL
SCREEN APTT: 32.5 SEC (ref 0–43.5)
SCREEN DRVVT: 32.4 SEC (ref 0–47)
THROMBIN TIME: 17.2 SEC (ref 0–23)

## 2023-02-23 LAB
APTT HEX PL PPP: 3 SEC
APTT IMM NP PPP: NORMAL SEC
APTT PPP 1:1 SALINE: NORMAL SEC
APTT PPP: 25.8 SEC
B2 GLYCOPROT1 IGA SER-ACNC: <10 SAU
B2 GLYCOPROT1 IGG SER-ACNC: <10 SGU
B2 GLYCOPROT1 IGM SER-ACNC: <10 SMU
CARDIOLIPIN IGG SER IA-ACNC: <10 GPL
CARDIOLIPIN IGM SER IA-ACNC: <10 MPL
CONFIRM APTT: 0 SEC
CONFIRM DRVVT: NORMAL SEC
DRVVT SCREEN TO CONFIRM RATIO: NORMAL RATIO
INR PPP: 1 RATIO
LABORATORY COMMENT REPORT: NORMAL
PROTHROMBIN TIME: 10.5 SEC
SCREEN DRVVT: 32.1 SEC
THROMBIN TIME: 16.8 SEC

## 2023-05-02 RX ORDER — LEVOTHYROXINE SODIUM 0.1 MG/1
100 TABLET ORAL DAILY
Qty: 90 TABLET | Refills: 0 | Status: SHIPPED | OUTPATIENT
Start: 2023-05-02

## 2023-05-02 NOTE — TELEPHONE ENCOUNTER
Med refill. James pt. Miscarriage 2/15/23 and Rx sent for 90 tablets then. [facility] pharmacy. Thank you.

## 2024-05-15 ENCOUNTER — TELEPHONE (OUTPATIENT)
Dept: OBSTETRICS AND GYNECOLOGY | Facility: CLINIC | Age: 39
End: 2024-05-15
Payer: COMMERCIAL

## 2024-05-15 DIAGNOSIS — O09.299 HISTORY OF MISCARRIAGE, CURRENTLY PREGNANT: Primary | ICD-10-CM

## 2024-05-15 DIAGNOSIS — N96 RECURRENT PREGNANCY LOSS: ICD-10-CM

## 2024-05-15 NOTE — TELEPHONE ENCOUNTER
Pt called stating she got a new pregnancy test and was calling to get her hCG levels checked. The hub scheduled her for her new ob appt. Do you mind to place an order for pt to get her hCG levels checked?    Please advise, thank you

## 2024-05-15 NOTE — TELEPHONE ENCOUNTER
Orders placed for hcg, progesterone and beta 2 glycoprotein (due to history of recurrent pregnancy loss).  Thanks!

## 2024-05-20 LAB — HCG INTACT+B SERPL-ACNC: NORMAL MIU/ML

## 2024-05-21 ENCOUNTER — TELEPHONE (OUTPATIENT)
Dept: OBSTETRICS AND GYNECOLOGY | Facility: CLINIC | Age: 39
End: 2024-05-21
Payer: COMMERCIAL

## 2024-05-21 DIAGNOSIS — O09.299 HISTORY OF MISCARRIAGE, CURRENTLY PREGNANT: Primary | ICD-10-CM

## 2024-05-21 LAB — PROGEST SERPL-MCNC: 25.3 NG/ML

## 2024-05-21 NOTE — TELEPHONE ENCOUNTER
Called pt and reviewed progesterone and hcg - LMP 4/12/24. No bleeding or pain. Recommend early US and will schedule in next week. If normal, has new ob appt on 6/14/24.

## 2024-05-22 LAB
B2 GLYCOPROT1 IGG SER-ACNC: <9 GPI IGG UNITS (ref 0–20)
B2 GLYCOPROT1 IGM SER-ACNC: <9 GPI IGM UNITS (ref 0–32)

## 2024-05-24 ENCOUNTER — TELEPHONE (OUTPATIENT)
Dept: OBSTETRICS AND GYNECOLOGY | Facility: CLINIC | Age: 39
End: 2024-05-24

## 2024-05-24 ENCOUNTER — PATIENT MESSAGE (OUTPATIENT)
Dept: OBSTETRICS AND GYNECOLOGY | Facility: CLINIC | Age: 39
End: 2024-05-24

## 2024-05-24 DIAGNOSIS — E03.9 HYPOTHYROIDISM (ACQUIRED): Primary | ICD-10-CM

## 2024-05-24 DIAGNOSIS — O09.299 HISTORY OF MISCARRIAGE, CURRENTLY PREGNANT: Primary | ICD-10-CM

## 2024-05-24 NOTE — TELEPHONE ENCOUNTER
Please schedule patient an US with follow up on 6/7. Please make sure she is aware of [employer] message sent today. Thanks!

## 2024-05-29 RX ORDER — PROMETHAZINE HYDROCHLORIDE 12.5 MG/1
12.5 TABLET ORAL EVERY 6 HOURS PRN
Qty: 30 TABLET | Refills: 0 | Status: SHIPPED | OUTPATIENT
Start: 2024-05-29

## 2024-06-07 ENCOUNTER — INITIAL PRENATAL (OUTPATIENT)
Dept: OBSTETRICS AND GYNECOLOGY | Facility: CLINIC | Age: 39
End: 2024-06-07
Payer: COMMERCIAL

## 2024-06-07 VITALS — WEIGHT: 130 LBS | DIASTOLIC BLOOD PRESSURE: 63 MMHG | SYSTOLIC BLOOD PRESSURE: 100 MMHG | BODY MASS INDEX: 24.58 KG/M2

## 2024-06-07 DIAGNOSIS — Z3A.08 8 WEEKS GESTATION OF PREGNANCY: Primary | ICD-10-CM

## 2024-06-07 DIAGNOSIS — E03.9 HYPOTHYROIDISM (ACQUIRED): ICD-10-CM

## 2024-06-07 DIAGNOSIS — O21.9 NAUSEA AND VOMITING IN PREGNANCY: ICD-10-CM

## 2024-06-07 DIAGNOSIS — O09.521 MULTIGRAVIDA OF ADVANCED MATERNAL AGE IN FIRST TRIMESTER: ICD-10-CM

## 2024-06-07 DIAGNOSIS — N96 RECURRENT PREGNANCY LOSS: ICD-10-CM

## 2024-06-07 DIAGNOSIS — Z98.891 HISTORY OF CESAREAN SECTION: ICD-10-CM

## 2024-06-07 LAB
GLUCOSE UR STRIP-MCNC: NEGATIVE MG/DL
PROT UR STRIP-MCNC: NEGATIVE MG/DL

## 2024-06-07 RX ORDER — DOXYLAMINE SUCCINATE AND PYRIDOXINE HYDROCHLORIDE 20; 20 MG/1; MG/1
1 TABLET, EXTENDED RELEASE ORAL
Qty: 60 TABLET | Refills: 1 | Status: SHIPPED | OUTPATIENT
Start: 2024-06-07

## 2024-06-07 RX ORDER — PROMETHAZINE HYDROCHLORIDE 12.5 MG/1
12.5 TABLET ORAL EVERY 6 HOURS PRN
Qty: 30 TABLET | Refills: 0 | Status: SHIPPED | OUTPATIENT
Start: 2024-06-07

## 2024-06-07 RX ORDER — VITAMIN A ACETATE, BETA CAROTENE, ASCORBIC ACID, CHOLECALCIFEROL, .ALPHA.-TOCOPHEROL ACETATE, DL-, THIAMINE MONONITRATE, RIBOFLAVIN, NIACINAMIDE, PYRIDOXINE HYDROCHLORIDE, FOLIC ACID, CYANOCOBALAMIN, CALCIUM CARBONATE, FERROUS FUMARATE, ZINC OXIDE, CUPRIC OXIDE 3080; 12; 120; 400; 1; 1.84; 3; 20; 22; 920; 25; 200; 27; 10; 2 [IU]/1; UG/1; MG/1; [IU]/1; MG/1; MG/1; MG/1; MG/1; MG/1; [IU]/1; MG/1; MG/1; MG/1; MG/1; MG/1
TABLET, FILM COATED ORAL DAILY
COMMUNITY

## 2024-06-07 RX ORDER — LEVOTHYROXINE SODIUM 0.1 MG/1
100 TABLET ORAL DAILY
Qty: 90 TABLET | Refills: 0 | Status: SHIPPED | OUTPATIENT
Start: 2024-06-07 | End: 2024-06-10

## 2024-06-07 NOTE — PROGRESS NOTES
"Initial OB Visit    Chief Complaint   Patient presents with    Initial Prenatal Visit      Humberto Weiss is being seen today for her first obstetrical visit.  She is a 39 y.o.    8w0d gestation by sure LMP  FOB: Cuco Duggan, he is the father of her daughter  This is a planned pregnancy.   OB History    Para Term  AB Living   8 1 1   5 1   SAB IAB Ectopic Molar Multiple Live Births   4 1       1      # Outcome Date GA Lbr Edward/2nd Weight Sex Type Anes PTL Lv   8 Current            7 IAB            6 Term 17 37w0d  2268 g (5 lb)  CS-Unspec   CLARENCE   5 2017           4 2016           3 2015           2 2014           1                 Current obstetric complaints: nausea, planning to move to [place] next week  - levothyroxine 100mcg  Prior obstetric issues, potential pregnancy concerns: Delivered by CS in [place] due to \"baby's head not able to turn\" (second stage arrest); had IAB last pregnancy.   Family history of genetic issues (includes FOB): denies; reports a h/o 4 SABs \"very early\" - like \"2 weeks\"- never required D&C or medication.  She reports that she saw M in [place] and was told everything was fine.    Prior infections concerning in pregnancy (Rash, fever since LMP): denies  Varicella Hx:reports vaccination  Prior genetic testing: negative CF/SMA last pregnancy  History of abnormal pap smears: denies  History of STIs: denies  History of HSV in self or partner? denies    Past Medical History:   Diagnosis Date    Hypothyroidism        Past Surgical History:   Procedure Laterality Date     SECTION           Current Outpatient Medications:     levothyroxine (SYNTHROID, LEVOTHROID) 100 MCG tablet, Take 1 tablet by mouth Daily., Disp: 90 tablet, Rfl: 0    prenatal vitamins (PRENATAL 27-1) 27-1 MG tablet tablet, Take  by mouth Daily., Disp: , Rfl:     promethazine (PHENERGAN) 12.5 MG tablet, Take 1 tablet by mouth Every 6 (Six) Hours As Needed for " Nausea or Vomiting., Disp: 30 tablet, Rfl: 0    doxylamine-pyridoxine ER (Bonjesta) 20-20 MG tablet controlled-release tablet, Take 1 tablet by mouth Every Morning Before Breakfast. Increase to one tablet QAM and one tablet QPM if needed, Disp: 60 tablet, Rfl: 1    No Known Allergies    Social History     Socioeconomic History    Marital status:    Tobacco Use    Smoking status: Never    Smokeless tobacco: Never   Substance and Sexual Activity    Alcohol use: Not Currently    Drug use: Never    Sexual activity: Yes     Partners: Male     Birth control/protection: None       Family History   Problem Relation Age of Onset    Breast cancer Neg Hx     Ovarian cancer Neg Hx     Uterine cancer Neg Hx     Colon cancer Neg Hx     Deep vein thrombosis Neg Hx     Pulmonary embolism Neg Hx        Review of systems    Review of Systems - Negative except per HPI for 10 point review of systems including General, Psychological, Ophthalmic, ENT, Endocrine, Respiratory, Cardiovascular, Gastrointestinal, Genito-Urinary, Musculoskeletal, Neurological, Dermatological        Objective    /63   Wt 59 kg (130 lb)   LMP 04/12/2024   BMI 24.58 kg/m²       General Appearance:    Alert, cooperative, in no acute distress, habitus normal   Head:    Normocephalic, without obvious abnormality, atraumatic   Eyes:            Lids and lashes normal, conjunctivae and sclerae normal, no   icterus, no pallor, corneas clear   Ears:    Ears appear intact with no abnormalities noted       Neck:   No adenopathy, supple, trachea midline, no thyromegaly   Back:     No kyphosis present, no scoliosis present,                       Lungs:     Clear to auscultation,respirations regular, even and                   unlabored    Heart:    Regular rhythm and normal rate, normal S1 and S2, no            murmur, no gallop, no rub, no click   Breast Exam:    No masses, No nipple discharge   Abdomen:     Normal bowel sounds, no masses, no organomegaly,  soft        non-tender, non-distended, no guarding, no rebound                 tenderness   Genitalia:    Vulva - BUS-WNL, NEFG    Vagina - No discharge, No bleeding    Cervix - No Lesions, closed     Uterus - Consistent with 6 weeks    Adnexa - No masses, NT    Pelvimetry - clinically adequate, gynecoid pelvis     Extremities:   Moves all extremities well, no edema, no cyanosis, no              redness   Pulses:   Pulses palpable and equal bilaterally   Skin:   No bleeding, bruising or rash   Lymph nodes:   No palpable adenopathy   Neurologic:   Sensation intact, A&O times 3      Assessment  Pregnancy at 8w0d    Hypothyroidism  H/o recurrent pregnancy loss- APLAS negative  Nausea, vomiting in pregnancy   H/o CS    Plan    Initial labs drawn, GC/CHL screen done  Patient is on Prenatal vitamins  Problem list reviewed and updated.  Reviewed nature of practice and hospital.  Reviewed recommended follow up, importance of compliance with care. We reviewed testing in pregnancy including HIV testing and urine drug screen.    Reviewed aneuploidy screening and CF/SMA screening.  We reviewed limitations of testing, possibility of false positive/negative results, possible need for other tests as indicated.    Counseled on limitations of ultrasound in pregnancy in detecting aneuploidy/fetal anomalies    Reviewed nausea, vomiting.She would like cell free DNA and will come back at 9w for blood draw.   Aware of limitations of testing, possibility of need for additional testing (such as amniocentesis), possibility of out of pocket expense, possibility of false positive/false negative results.       H/o recurrent pregnancy loss:   Negative APLAS.    Hypothyroidism:   Check TSH, free T4 and adjust accordingly.     Travel precautions reviewed  All questions answered.   Return in about 4 weeks (around 7/5/2024).      Susana Ramirez MD

## 2024-06-08 LAB
AMPHETAMINES UR QL SCN: NEGATIVE NG/ML
BARBITURATES UR QL SCN: NEGATIVE NG/ML
BENZODIAZ UR QL: NEGATIVE NG/ML
BZE UR QL: NEGATIVE NG/ML
CANNABINOIDS UR QL SCN: NEGATIVE NG/ML
METHADONE UR QL SCN: NEGATIVE NG/ML
OPIATES UR QL: NEGATIVE NG/ML
PCP UR QL SCN: NEGATIVE NG/ML
PROPOXYPH UR QL SCN: NEGATIVE NG/ML
T4 FREE SERPL-MCNC: 1.3 NG/DL (ref 0.82–1.77)
TSH SERPL DL<=0.005 MIU/L-ACNC: 6.52 UIU/ML (ref 0.45–4.5)

## 2024-06-09 LAB
BACTERIA UR CULT: NO GROWTH
BACTERIA UR CULT: NORMAL

## 2024-06-10 ENCOUNTER — TELEPHONE (OUTPATIENT)
Dept: OBSTETRICS AND GYNECOLOGY | Facility: CLINIC | Age: 39
End: 2024-06-10
Payer: COMMERCIAL

## 2024-06-10 LAB
ABO GROUP BLD: ABNORMAL
BASOPHILS # BLD AUTO: 0 X10E3/UL (ref 0–0.2)
BASOPHILS NFR BLD AUTO: 0 %
BLD GP AB SCN SERPL QL: NEGATIVE
C TRACH RRNA SPEC QL NAA+PROBE: NEGATIVE
EOSINOPHIL # BLD AUTO: 0.2 X10E3/UL (ref 0–0.4)
EOSINOPHIL NFR BLD AUTO: 2 %
ERYTHROCYTE [DISTWIDTH] IN BLOOD BY AUTOMATED COUNT: 13.7 % (ref 11.7–15.4)
HBV SURFACE AG SERPL QL IA: NEGATIVE
HCT VFR BLD AUTO: 34.9 % (ref 34–46.6)
HCV AB SERPL QL IA: NORMAL
HCV IGG SERPL QL IA: NON REACTIVE
HGB BLD-MCNC: 11.2 G/DL (ref 11.1–15.9)
HIV 1+2 AB+HIV1 P24 AG SERPL QL IA: NON REACTIVE
IMM GRANULOCYTES # BLD AUTO: 0.1 X10E3/UL (ref 0–0.1)
IMM GRANULOCYTES NFR BLD AUTO: 1 %
LYMPHOCYTES # BLD AUTO: 2.7 X10E3/UL (ref 0.7–3.1)
LYMPHOCYTES NFR BLD AUTO: 22 %
MCH RBC QN AUTO: 29.7 PG (ref 26.6–33)
MCHC RBC AUTO-ENTMCNC: 32.1 G/DL (ref 31.5–35.7)
MCV RBC AUTO: 93 FL (ref 79–97)
MONOCYTES # BLD AUTO: 1 X10E3/UL (ref 0.1–0.9)
MONOCYTES NFR BLD AUTO: 8 %
N GONORRHOEA RRNA SPEC QL NAA+PROBE: NEGATIVE
NEUTROPHILS # BLD AUTO: 8.6 X10E3/UL (ref 1.4–7)
NEUTROPHILS NFR BLD AUTO: 67 %
PLATELET # BLD AUTO: 158 X10E3/UL (ref 150–450)
RBC # BLD AUTO: 3.77 X10E6/UL (ref 3.77–5.28)
RH BLD: POSITIVE
RUBV IGG SERPL IA-ACNC: 2.05 INDEX
T VAGINALIS RRNA SPEC QL NAA+PROBE: NEGATIVE
TREPONEMA PALLIDUM IGG+IGM AB [PRESENCE] IN SERUM OR PLASMA BY IMMUNOASSAY: NON REACTIVE
WBC # BLD AUTO: 12.7 X10E3/UL (ref 3.4–10.8)

## 2024-06-10 RX ORDER — LEVOTHYROXINE SODIUM 0.12 MG/1
125 TABLET ORAL DAILY
Qty: 90 TABLET | Refills: 1 | Status: SHIPPED | OUTPATIENT
Start: 2024-06-10 | End: 2025-06-10

## 2024-06-10 NOTE — TELEPHONE ENCOUNTER
Spoke to pt, informed pt and pt verbalized understanding. Pt states she still vomits after eating even though she is taking the medicine phenergan that you prescribed her. Please advise

## 2024-06-10 NOTE — TELEPHONE ENCOUNTER
Please let patient know that her tsh was high at 6.52 but free T4 was normal.  However in the first trimester we ideally want TSH < 2.5. I will order a new dose of levothyroxine (125mcg) to her pharmacy.  I recommend repeating the TSH in 4-6 weeks. Thanks!

## 2024-06-11 ENCOUNTER — TELEPHONE (OUTPATIENT)
Dept: OBSTETRICS AND GYNECOLOGY | Facility: CLINIC | Age: 39
End: 2024-06-11
Payer: COMMERCIAL

## 2024-06-11 LAB
CYTOLOGIST CVX/VAG CYTO: NORMAL
CYTOLOGY CVX/VAG DOC CYTO: NORMAL
CYTOLOGY CVX/VAG DOC THIN PREP: NORMAL
DX ICD CODE: NORMAL
HPV I/H RISK 4 DNA CVX QL PROBE+SIG AMP: NEGATIVE
Lab: NORMAL
OTHER STN SPEC: NORMAL
STAT OF ADQ CVX/VAG CYTO-IMP: NORMAL

## 2024-06-11 RX ORDER — DOXYLAMINE SUCCINATE AND PYRIDOXINE HYDROCHLORIDE, DELAYED RELEASE TABLETS 10 MG/10 MG 10; 10 MG/1; MG/1
TABLET, DELAYED RELEASE ORAL
Qty: 180 TABLET | OUTPATIENT
Start: 2024-06-11

## 2024-06-11 RX ORDER — DOXYLAMINE SUCCINATE AND PYRIDOXINE HYDROCHLORIDE, DELAYED RELEASE TABLETS 10 MG/10 MG 10; 10 MG/1; MG/1
TABLET, DELAYED RELEASE ORAL
Qty: 100 TABLET | Refills: 0 | Status: SHIPPED | OUTPATIENT
Start: 2024-06-11

## 2024-06-11 NOTE — TELEPHONE ENCOUNTER
Yes, you can fax order for MT21 to that lab, but please fax with instructions to send it back to us and make sure patient calls us if she does not hear the results in 10 business days. Thanks!

## 2024-06-11 NOTE — TELEPHONE ENCOUNTER
Dr. Ramirez, is the only order pt needing done for labs [id number]? She states she is moving and this was discussed with you. Please advise, thank you!

## 2024-06-11 NOTE — TELEPHONE ENCOUNTER
I sent an Rx for Bonjesta initially. Appears maybe it was pricey, so I sent a new Rx for Diclegis.  I would recommend adding this and taking it scheduled - still use phenergan as needed for symptoms. If neither are covered, she can  the following OTC:  B6: Also known as pyridoxine - please take 10 to 25 mg orally every six to eight hours; the maximum treatment dose suggested for pregnant women is 200 mg/day.  Doxylamine: is available in some over-the-counter sleeping pills (eg, Unisom Sleep Tabs). One-half of the 25 mg over-the-counter tablet can be used as an antiemetic    Additionally, try the following over the counter products:  Ginger chews, ginger tea, ginger gum  Mint tea, peppermint gum or candy    If these do not work, we may need to try other prescription medications.    Please contact us if unable to tolerate liquids (even sips of water) for over six to eight hours, have weight loss of over 10% of your body weight, blood in vomit, fever (temp of 100.4 or higher), or other concerning symptoms arise.

## 2024-06-11 NOTE — TELEPHONE ENCOUNTER
Caller: GLEN APONTE    Relationship: Emergency Contact    Best call back number: [phone]    What orders are you requesting (i.e. lab or imaging): LABS    In what timeframe would the patient need to come in: ASAP    Where will you receive your lab/imaging services: [facility]  [address] [place] [id number]  #[phone]    Additional notes: PT SPOUSE ASKING IF LAB ORDERS CAN BE FAX TO FACILITY ABOVE. PT IS  MOVING TO [place] ON FRIDAY 6/14/2024.

## 2024-06-19 ENCOUNTER — TELEPHONE (OUTPATIENT)
Dept: OBSTETRICS AND GYNECOLOGY | Facility: CLINIC | Age: 39
End: 2024-06-19
Payer: COMMERCIAL

## 2024-06-19 RX ORDER — ONDANSETRON 4 MG/1
4 TABLET, FILM COATED ORAL EVERY 8 HOURS PRN
Qty: 30 TABLET | Refills: 3 | Status: SHIPPED | OUTPATIENT
Start: 2024-06-19

## 2024-06-19 RX ORDER — PROMETHAZINE HYDROCHLORIDE 25 MG/1
12.5 TABLET ORAL EVERY 6 HOURS PRN
Qty: 30 TABLET | Refills: 1 | Status: SHIPPED | OUTPATIENT
Start: 2024-06-19

## 2024-06-19 NOTE — TELEPHONE ENCOUNTER
Hayder Velasquezay to trial phenergan and zofran.   Rx sent.     Hydration important.     Thanks,   Dr. Valdivia    ----- Message from Eva LANDAVERDE sent at 6/19/2024  1:23 PM EDT -----  Regarding: Vomit  Contact: [phone]  James pt. 9wk5d. OB & US 6/7/24Rx diclegis was last sent. Works 40% of the time she states. Looking for something that works better.  Jessicaeens. Thank you.       ----- Message -----  From: Humberto Weiss  Sent: 6/19/2024  12:46 PM EDT  To: [facility]  Subject: Vomit                                            Good afternoon Dr. Ramirez,    I have been trying the vomit medication that you have prescribed but they work 40%. Most of the time, I am unable to hold and continue to vomit. Do you think anything else can work better? Please advise.    Humberto Pardo

## 2024-06-20 ENCOUNTER — TELEPHONE (OUTPATIENT)
Dept: OBSTETRICS AND GYNECOLOGY | Facility: CLINIC | Age: 39
End: 2024-06-20
Payer: COMMERCIAL

## 2024-06-20 NOTE — TELEPHONE ENCOUNTER
Eva,     I don 't understand. Is this tests we did or that she has to show Dr. Ramirez or she is bringing to Dr. Ramirez?  This message is confusing to me and I see no specific testing.  Is there something I am supposed to do about this?    Thanks,   Dr. Shea Ramirez pt. Calling for F/U on results from testing that was performed in [place]. Also requesting all medical records be faxed to new Provider at [facility], Fax [phone]. Thank you       Caller: GLEN APONTE    Relationship: Emergency Contact    Best call back number: [phone]    What is the best time to reach you: ANY    Who are you requesting to speak with (clinical staff, provider,  specific staff member): CLINICAL    What was the call regarding: PATIENT IS CALLING TO FOLLOW UP ON RESULTS FROM TESTING THAT WAS PERFORMED IN [place].  PATIENT IS ALSO REQUESTING TO HAVE ALL PREVIOUS OBGYN MEDICAL RECORDS TO BE FAXED TO HER NEW PROVIDER AT [facility]. FAX #: [phone].

## 2024-06-20 NOTE — TELEPHONE ENCOUNTER
My Chart message to let her know Dr Valdivia sent in phenergan and zofran for her to try. Phenergan can make you sleepy, so be aware of that. Hydration is very important, so try to drink at least 8 glasses of water a day, especially in this heat. Take care and we hope you will feel better.

## 2024-06-22 LAB
CFDNA.FET/CFDNA.TOTAL SFR FETUS: NORMAL %
CITATION REF LAB TEST: NORMAL
FET 13+18+21+X+Y ANEUP PLAS.CFDNA: NEGATIVE
FET CHR 21 TS PLAS.CFDNA QL: NEGATIVE
FET MS X RISK WBC.DNA+CFDNA QL: NOT DETECTED
FET SEX PLAS.CFDNA DOSAGE CFDNA: NORMAL
FET TS 13 RISK PLAS.CFDNA QL: NEGATIVE
FET TS 18 RISK WBC.DNA+CFDNA QL: NEGATIVE
FET X + Y ANEUP RISK PLAS.CFDNA SEQ-IMP: NOT DETECTED
GA EST FROM CONCEPTION DATE: NORMAL D
GESTATIONAL AGE > 9:: YES
LAB DIRECTOR NAME PROVIDER: NORMAL
LAB DIRECTOR NAME PROVIDER: NORMAL
LABORATORY COMMENT REPORT: NORMAL
LIMITATIONS OF THE TEST: NORMAL
NEGATIVE PREDICTIVE VALUE: NORMAL
NOTE: NORMAL
PERFORMANCE CHARACTERISTICS: NORMAL
POSITIVE PREDICTIVE VALUE: NORMAL
REF LAB TEST METHOD: NORMAL
TEST PERFORMANCE INFO SPEC: NORMAL

## 2024-06-25 NOTE — TELEPHONE ENCOUNTER
Eva- if she is referring to [id number] it was negative/low risk for trisomy 21, 18,13 and if she wishes to know fetal sex testing showed it is a boy. [place] is [place] so she will need to sign a request to send records to her provider in [place]. Thanks!

## 2024-06-25 NOTE — TELEPHONE ENCOUNTER
Dr Ramirez said if you are referring to [id number] it was negative/low risk for trisomy 21, 18,13 and if you wish to know fetal sex testing, let us know.  You  will need to sign a request to send records to you provider in [place].  Thanks!

## 2024-09-04 RX ORDER — LEVOTHYROXINE SODIUM 100 UG/1
100 TABLET ORAL DAILY
Qty: 90 TABLET | Refills: 0 | OUTPATIENT
Start: 2024-09-04